# Patient Record
Sex: FEMALE | Race: WHITE | NOT HISPANIC OR LATINO | Employment: OTHER | URBAN - METROPOLITAN AREA
[De-identification: names, ages, dates, MRNs, and addresses within clinical notes are randomized per-mention and may not be internally consistent; named-entity substitution may affect disease eponyms.]

---

## 2021-04-08 ENCOUNTER — HOSPITAL ENCOUNTER (OUTPATIENT)
Dept: TELEMEDICINE | Facility: HOSPITAL | Age: 66
Discharge: HOME | End: 2021-04-08

## 2021-04-08 PROCEDURE — 99499 UNLISTED E&M SERVICE: CPT | Mod: GT,,, | Performed by: PSYCHIATRY & NEUROLOGY

## 2021-04-08 PROCEDURE — 99499 NO LOS: ICD-10-PCS | Mod: GT,,, | Performed by: PSYCHIATRY & NEUROLOGY

## 2023-09-26 ENCOUNTER — LAB VISIT (OUTPATIENT)
Dept: LAB | Facility: HOSPITAL | Age: 68
End: 2023-09-26
Payer: MEDICARE

## 2023-09-26 ENCOUNTER — TELEPHONE (OUTPATIENT)
Dept: NEUROLOGY | Facility: CLINIC | Age: 68
End: 2023-09-26

## 2023-09-26 ENCOUNTER — OFFICE VISIT (OUTPATIENT)
Dept: NEUROLOGY | Facility: CLINIC | Age: 68
End: 2023-09-26
Payer: MEDICARE

## 2023-09-26 VITALS
BODY MASS INDEX: 28.33 KG/M2 | DIASTOLIC BLOOD PRESSURE: 83 MMHG | SYSTOLIC BLOOD PRESSURE: 136 MMHG | WEIGHT: 176.25 LBS | RESPIRATION RATE: 16 BRPM | HEIGHT: 66 IN | HEART RATE: 83 BPM

## 2023-09-26 DIAGNOSIS — E78.2 MIXED HYPERLIPIDEMIA: ICD-10-CM

## 2023-09-26 DIAGNOSIS — F41.9 ANXIETY: ICD-10-CM

## 2023-09-26 DIAGNOSIS — R41.3 MEMORY LOSS: Primary | ICD-10-CM

## 2023-09-26 DIAGNOSIS — E53.8 B12 DEFICIENCY: ICD-10-CM

## 2023-09-26 DIAGNOSIS — E55.9 VITAMIN D DEFICIENCY, UNSPECIFIED: ICD-10-CM

## 2023-09-26 DIAGNOSIS — Z51.81 THERAPEUTIC DRUG MONITORING: ICD-10-CM

## 2023-09-26 DIAGNOSIS — M43.02 CERVICAL SPONDYLOLYSIS: ICD-10-CM

## 2023-09-26 DIAGNOSIS — R41.3 MEMORY LOSS: ICD-10-CM

## 2023-09-26 DIAGNOSIS — I10 PRIMARY HYPERTENSION: ICD-10-CM

## 2023-09-26 DIAGNOSIS — G47.33 OSA (OBSTRUCTIVE SLEEP APNEA): ICD-10-CM

## 2023-09-26 DIAGNOSIS — R41.82 ALTERED MENTAL STATUS, UNSPECIFIED ALTERED MENTAL STATUS TYPE: ICD-10-CM

## 2023-09-26 DIAGNOSIS — K21.9 GASTROESOPHAGEAL REFLUX DISEASE WITHOUT ESOPHAGITIS: ICD-10-CM

## 2023-09-26 DIAGNOSIS — D50.9 IRON DEFICIENCY ANEMIA, UNSPECIFIED IRON DEFICIENCY ANEMIA TYPE: ICD-10-CM

## 2023-09-26 LAB
ALBUMIN SERPL BCP-MCNC: 3.8 G/DL (ref 3.5–5.2)
ALP SERPL-CCNC: 91 U/L (ref 55–135)
ALT SERPL W/O P-5'-P-CCNC: 12 U/L (ref 10–44)
ANION GAP SERPL CALC-SCNC: 9 MMOL/L (ref 8–16)
AST SERPL-CCNC: 16 U/L (ref 10–40)
BASOPHILS # BLD AUTO: 0.03 K/UL (ref 0–0.2)
BASOPHILS NFR BLD: 0.4 % (ref 0–1.9)
BILIRUB SERPL-MCNC: 0.5 MG/DL (ref 0.1–1)
BUN SERPL-MCNC: 14 MG/DL (ref 8–23)
CALCIUM SERPL-MCNC: 9.4 MG/DL (ref 8.7–10.5)
CHLORIDE SERPL-SCNC: 106 MMOL/L (ref 95–110)
CO2 SERPL-SCNC: 28 MMOL/L (ref 23–29)
CREAT SERPL-MCNC: 0.9 MG/DL (ref 0.5–1.4)
CRP SERPL-MCNC: 7.2 MG/L (ref 0–8.2)
DIFFERENTIAL METHOD: ABNORMAL
EOSINOPHIL # BLD AUTO: 0.3 K/UL (ref 0–0.5)
EOSINOPHIL NFR BLD: 4.5 % (ref 0–8)
ERYTHROCYTE [DISTWIDTH] IN BLOOD BY AUTOMATED COUNT: 12.2 % (ref 11.5–14.5)
ERYTHROCYTE [SEDIMENTATION RATE] IN BLOOD BY PHOTOMETRIC METHOD: 41 MM/HR (ref 0–36)
EST. GFR  (NO RACE VARIABLE): >60 ML/MIN/1.73 M^2
GLUCOSE SERPL-MCNC: 104 MG/DL (ref 70–110)
HCT VFR BLD AUTO: 44.9 % (ref 37–48.5)
HGB BLD-MCNC: 15.7 G/DL (ref 12–16)
IMM GRANULOCYTES # BLD AUTO: 0.03 K/UL (ref 0–0.04)
IMM GRANULOCYTES NFR BLD AUTO: 0.4 % (ref 0–0.5)
LYMPHOCYTES # BLD AUTO: 1.6 K/UL (ref 1–4.8)
LYMPHOCYTES NFR BLD: 22.1 % (ref 18–48)
MCH RBC QN AUTO: 34.3 PG (ref 27–31)
MCHC RBC AUTO-ENTMCNC: 35 G/DL (ref 32–36)
MCV RBC AUTO: 98 FL (ref 82–98)
MONOCYTES # BLD AUTO: 0.4 K/UL (ref 0.3–1)
MONOCYTES NFR BLD: 5.2 % (ref 4–15)
NEUTROPHILS # BLD AUTO: 4.8 K/UL (ref 1.8–7.7)
NEUTROPHILS NFR BLD: 67.4 % (ref 38–73)
NRBC BLD-RTO: 0 /100 WBC
PLATELET # BLD AUTO: 245 K/UL (ref 150–450)
PMV BLD AUTO: 10.7 FL (ref 9.2–12.9)
POTASSIUM SERPL-SCNC: 3.5 MMOL/L (ref 3.5–5.1)
PROT SERPL-MCNC: 7.2 G/DL (ref 6–8.4)
RBC # BLD AUTO: 4.58 M/UL (ref 4–5.4)
SODIUM SERPL-SCNC: 143 MMOL/L (ref 136–145)
WBC # BLD AUTO: 7.18 K/UL (ref 3.9–12.7)

## 2023-09-26 PROCEDURE — 80053 COMPREHEN METABOLIC PANEL: CPT | Performed by: NURSE PRACTITIONER

## 2023-09-26 PROCEDURE — 87389 HIV-1 AG W/HIV-1&-2 AB AG IA: CPT | Performed by: NURSE PRACTITIONER

## 2023-09-26 PROCEDURE — 99999 PR PBB SHADOW E&M-EST. PATIENT-LVL IV: CPT | Mod: PBBFAC,,, | Performed by: NURSE PRACTITIONER

## 2023-09-26 PROCEDURE — 36415 COLL VENOUS BLD VENIPUNCTURE: CPT | Mod: PO | Performed by: NURSE PRACTITIONER

## 2023-09-26 PROCEDURE — 82306 VITAMIN D 25 HYDROXY: CPT | Performed by: NURSE PRACTITIONER

## 2023-09-26 PROCEDURE — 82746 ASSAY OF FOLIC ACID SERUM: CPT | Performed by: NURSE PRACTITIONER

## 2023-09-26 PROCEDURE — 99214 OFFICE O/P EST MOD 30 MIN: CPT | Mod: PBBFAC,PO | Performed by: NURSE PRACTITIONER

## 2023-09-26 PROCEDURE — 84425 ASSAY OF VITAMIN B-1: CPT | Performed by: NURSE PRACTITIONER

## 2023-09-26 PROCEDURE — 83921 ORGANIC ACID SINGLE QUANT: CPT | Performed by: NURSE PRACTITIONER

## 2023-09-26 PROCEDURE — 86140 C-REACTIVE PROTEIN: CPT | Performed by: NURSE PRACTITIONER

## 2023-09-26 PROCEDURE — 84443 ASSAY THYROID STIM HORMONE: CPT | Performed by: NURSE PRACTITIONER

## 2023-09-26 PROCEDURE — 85025 COMPLETE CBC W/AUTO DIFF WBC: CPT | Performed by: NURSE PRACTITIONER

## 2023-09-26 PROCEDURE — 99215 OFFICE O/P EST HI 40 MIN: CPT | Mod: S$PBB,,, | Performed by: NURSE PRACTITIONER

## 2023-09-26 PROCEDURE — 86592 SYPHILIS TEST NON-TREP QUAL: CPT | Performed by: NURSE PRACTITIONER

## 2023-09-26 PROCEDURE — 99215 PR OFFICE/OUTPT VISIT, EST, LEVL V, 40-54 MIN: ICD-10-PCS | Mod: S$PBB,,, | Performed by: NURSE PRACTITIONER

## 2023-09-26 PROCEDURE — 85651 RBC SED RATE NONAUTOMATED: CPT | Mod: PO | Performed by: NURSE PRACTITIONER

## 2023-09-26 PROCEDURE — 82140 ASSAY OF AMMONIA: CPT | Performed by: NURSE PRACTITIONER

## 2023-09-26 PROCEDURE — 99999 PR PBB SHADOW E&M-EST. PATIENT-LVL IV: ICD-10-PCS | Mod: PBBFAC,,, | Performed by: NURSE PRACTITIONER

## 2023-09-26 PROCEDURE — 0346U AD DETECT, BETA-AMYLOID 42/40 RATIO: CPT | Performed by: NURSE PRACTITIONER

## 2023-09-26 PROCEDURE — 82607 VITAMIN B-12: CPT | Performed by: NURSE PRACTITIONER

## 2023-09-26 RX ORDER — HYDROCHLOROTHIAZIDE 12.5 MG/1
12.5 TABLET ORAL DAILY
COMMUNITY

## 2023-09-26 RX ORDER — MEDROXYPROGESTERONE ACETATE 2.5 MG/1
2.5 TABLET ORAL DAILY
COMMUNITY

## 2023-09-26 RX ORDER — ROSUVASTATIN CALCIUM 10 MG/1
10 TABLET, COATED ORAL DAILY
COMMUNITY

## 2023-09-26 RX ORDER — FERROUS SULFATE 325(65) MG
325 TABLET ORAL
COMMUNITY

## 2023-09-26 RX ORDER — AMLODIPINE BESYLATE 10 MG/1
10 TABLET ORAL DAILY
COMMUNITY

## 2023-09-26 RX ORDER — MELOXICAM 15 MG/1
15 TABLET ORAL DAILY
COMMUNITY

## 2023-09-26 RX ORDER — FAMOTIDINE 20 MG/1
20 TABLET, FILM COATED ORAL 2 TIMES DAILY
COMMUNITY

## 2023-09-26 RX ORDER — FLUOXETINE HYDROCHLORIDE 20 MG/1
20 CAPSULE ORAL DAILY
COMMUNITY

## 2023-09-26 NOTE — PATIENT INSTRUCTIONS
Mild cognitive impairment (MCI) is defined as the transitional state between the cognitive changes of normal aging and the fully developed features of dementia. Some people with MCI experience little to no disease progression while others may progressively decline. Unfortunately, the clinical progression is unpredictable. Based on today's visit, I thinks that you lie somewhere between having MCI or even mild dementia. The cause is to be determines.     We will obtain some basic labs to rule out any reversible causes for your memory loss, such as a nutritional deficiency or a thyroid problem. We will also obtain some baseline neuroimaging to look at the brain structure itself. I have also referred you for a more in-depth cognitive assessment with our neuropyschologist. Please be aware that this process may take a few months to complete (about 3-4 months). We also need to get an EEG.     Certain medications have been shown to slow the clinical progression of MCI in some people. It is important to understand that these medications cannot reverse any pre-existing process. Prior to starting a medication, we need to get an EKG to ensure that you don't have any heart rhythm issues.     In the setting of MCI, it is very important to work diligently to keep the blood vessels healthy to prevent any worsening. You should continue to work closely with your PCP to control your blood pressure, cholesterol and blood sugar. The Mediteranean diet is also recommended to promote overall vascular health.     I recommend that you do wear your CPAP as untreated sleep apnea leads to decreased oxygen to the brain.     Continue not to drive    Follow up here in 4 weeks     While memory loss may not be reversible in many cases, we do know that there are several steps that you can take to prevent further memory loss:  Diet:  A Mediterranean style diet has been shown to be beneficial. This diet typically includes fresh fruits/vegetables, whole  grains, fish and poultry. Foods, such as red meat, dairy and processed foods are avoided.   Research indicates certain nutrients may aid in brain function, such as B vitamins (especially B6, B12, and folic acid), antioxidants (such as vitamins C and E, and beta carotene), and Omega-3 fatty acids.  Minimize or eliminate the use of alcohol     Medications:  Discuss any prescription or over the counter medications you might be taking with your doctor to avoid those that can cause sedation or worsen cognitive function, such as sleep aids, benzodiazepines, and antihistamines.     Socialization and Exercise:  30-45 minutes of brisk physical activity 5 days/week has been shown to improve function in vascular dementias, lower the risk of stroke and slow the progression of memory loss  Activities that are engaging or mentally stimulating, such as word puzzles, jigsaw puzzles and Sudoku, are also beneficial to cognitive health  Regular interaction with friends, family and community are also known to be helpful.     Sleep:  Establish a regular, consistent sleep pattern and practice good sleep hygiene.    Avoid screen time (computer, TV, smartphones or tablets) or heavy meals for at least an hour before bedtime.   Avoid caffeine or stimulants after 2 PM.   Exercise earlier in the day or mornings and keep your sleeping environment comfortable. Bedtime and wake-up times should be consistent every night and morning so the body becomes used to a single routine, even on the weekends.   Having a wind down routine (e.g., soft lights in the house, bath before bed, reduced fluid intake, songs, reading, less noise) also helps to promote sleep readiness.   Untreated obstructive sleep apnea can lead to an increased risk for stroke and further memory loss      STRATEGIES TO COPE WITH YOUR COGNITIVE DEFICITS:  Pay Attention!  Reduce distractions in the area  Look at the person speaking to you & paraphrase what they are saying  Write down  important things  Ask them to repeat themselves if you zone out  Ask people to simplify or reduce information if you need to  Processing Speed  Using multiple methods to learn new information, such as listening, taking notes, and recording, can be helpful  Give yourself enough time to complete certain tasks to reduce frustration  Executive Functioning  Don't try to multi-task. Do tasks separately to ensure that each gets completed.   Use a calendar or planner to keep you on track  Write down steps to complicated tasks in case you forget  Storing Information  Immediately after you learn something, try to recall it. Repeat this and gradually lengthen the interval between your recalls  Recalling information   Jog your memory! If you loose something, try to think back to when you last had it. Mentally walk yourself through the steps of your day to prod your memory.   Use clues, timers, memos, notes, etc.  Stay organized - keep your keys in a certain place, put your important papers in a certain place, etc.   Having a routine helps to anchor memories as well

## 2023-09-26 NOTE — PROGRESS NOTES
NEUROLOGY  Outpatient Consultation Visit     [facility]  [address] [facility], [place] [address]  [phone] (office) / [phone] (fax)    Patient Name:  Jackie Berry  :  1955  MR #:  [id number]  Acct #:  [id number]    Date of  Visit: 2023    Other Physicians:  Jessi Torre FNP (Primary Care Physician)      CHIEF COMPLAINT: Memory Loss        HISTORY OF PRESENT ILLNESS:  Jackie Berry is a 68 y.o. R-handed female seen in consultation for memory loss per Shasha Connors MD    Medical history is significant for cervical spondylosis, hx of thyroid disease, anxiety, HTN, vitamin B12 deficiency, GERD, iron deficiency anemia, HLD    Here today with her , who assists with history    HS level education. 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 . 2 adult kids, one in [place] and one in [place].     No FH of dementia that she knows.     Onset was about 5 years ago. It started as ST forgetfulness and repeating questions. She was confusing dates of events. She has been cleaning the same houses for decades and had trouble navigating to them. Every September, they vacation with friends to [place]. A few days ago, they were discussing their recent trip and she couldn't remember having visited this place before. She also later asked her  if he knew her (their) daughter and then thought that they recently got . This episode triggered them to seek a neurology evaluation. Progression has been gradual since onset per .     She is not driving anymore. Used to be a wonderful cook, but her  just retired and loves to cook so she doesn't have to do it anymore. Hasn't made any mistakes when she does cook. She used to pay the bills, but her  has been doing this as a precaution. He has also been helping with meds as a precaution.  notes some executive  "dysfunction. They don't notice any language difficulty.     No physical changes, like falls or gait change.     No personality changes or hallucinations. She does get depressed at times when she realizes that she is forgetting things, but not a day to day issue. Feels stable on Prozac.     She sleeps well. She was diagnosed with sleep apnea in the past and has a CPAP. Study was done 4-5 years ago in [place]; they don't know how severe. She doesn't use it consistently.     She initially saw Dr. Toussaint in [place] for evaluation. She did have NP testing at one time, maybe 5-6 years ago. They don't recall what the results indicated. She had a brain MRI that showed a "slight stroke" that she was asymptomatic of. About 1 year ago, she had a follow up visit. Repeat NP testing was recommended, but they have not been able to get in touch with anyone about this in over 1 year.     She is on monthly B12 injections. Denies hx of gastric surgery.     On progesterone, not sure why.     Former smoker, quit 40 years ago. Has a glass of wine or 2 in the evenings most days.     She had a colonoscopy that showed some ulcers and she was told to not take NSAIDs in the past.        Allergies:  Review of patient's allergies indicates:  No Known Allergies    Current Medications:  Current Outpatient Medications   Medication Sig Dispense Refill    amLODIPine (NORVASC) 10 MG tablet Take 10 mg by mouth once daily.      cyanocobalamin, vitamin B-12, 1,000 mcg/mL Kit Inject as directed.      famotidine (PEPCID) 20 MG tablet Take 20 mg by mouth 2 (two) times daily.      ferrous sulfate (FEOSOL) 325 mg (65 mg iron) Tab tablet Take 325 mg by mouth daily with breakfast.      FLUoxetine 20 MG capsule Take 20 mg by mouth once daily.      hydroCHLOROthiazide (HYDRODIURIL) 12.5 MG Tab Take 12.5 mg by mouth once daily.      medroxyPROGESTERone (PROVERA) 2.5 MG tablet Take 2.5 mg by mouth once daily.      meloxicam (MOBIC) 15 MG tablet Take 15 mg by mouth " "once daily.      rosuvastatin (CRESTOR) 10 MG tablet Take 10 mg by mouth once daily.       No current facility-administered medications for this visit.       Past Medical History:  History reviewed. No pertinent past medical history.    Past Surgical History:  History reviewed. No pertinent surgical history.    Family History:  family history is not on file.    Social History:   reports that she has quit smoking. Her smoking use included cigarettes. She has never used smokeless tobacco. She reports current alcohol use.      REVIEW OF SYSTEMS:  As per HPI    PHYSICAL EXAM:  /83 (BP Location: Right arm, Patient Position: Sitting, BP Method: Medium (Automatic))   Pulse 83   Resp 16   Ht 5' 6" (1.676 m)   Wt 79.9 kg (176 lb 4.1 oz)   BMI 28.45 kg/m²     General: Well groomed. No acute distress.  Pulmonary: Normal effort and rate.   Musculoskeletal: No obvious joint deformities, moves all extremities well.  Extremities: No clubbing, cyanosis. L knee edema.     Neurological exam:  Mental status: Awake and alert.  Oriented to person and situation. Aware in medical office, but not location. Disoriented to time. Recent memory impaired. Remote seems intact. Fund of knowledge seems limited (names president, but not sure of recent or upcoming holiday). Decreased attention and concentration. MMSE 16/30.   Speech/Language: No dysarthria or overt aphasia on conversation but does have mild WFD at times. Able to follow complex commands.   Cranial nerves (II-XII): Visual fields full. Pupils equal round and reactive to light, extraocular movements intact, no ptosis, no nystagmus. Facial sensation and symmetry intact bilaterally. Hearing grossly intact. Palate mobile and midline. Shoulder shrug normal bilaterally. Normal tongue protrusion.   Motor: 5 out of 5 strength throughout the upper and lower extremities bilaterally. Normal bulk and tone. No abnormal movements noted. No drift appreciated.   Sensation: Intact to light " "touch and vibration bilaterally.   DTR: 3+ at the knees and biceps bilaterally. + R Martines's. No clonus at the ankles.   Coordination: Finger-nose-finger testing intact bilaterally. AMARA normal bilaterally. No tremor. Romberg absent.   Gait: Antalgic, favoring the L knee (reports trip and fall going up stairs recently, injury to L knee and seeing ortho later today)    DIAGNOSTIC DATA:  I have personally reviewed provider notes, labs and imaging made available to me today.     Imaging:  Na    Cardiac:  Na     Labs:  Lab Results   Component Value Date    WBC 7.18 09/26/2023    HGB 15.7 09/26/2023    HCT 44.9 09/26/2023     09/26/2023    MCV 98 09/26/2023    RDW 12.2 09/26/2023     Lab Results   Component Value Date     09/26/2023    K 3.5 09/26/2023     09/26/2023    CO2 28 09/26/2023    BUN 14 09/26/2023    CREATININE 0.9 09/26/2023     09/26/2023    CALCIUM 9.4 09/26/2023     Lab Results   Component Value Date    PROT 7.2 09/26/2023    ALBUMIN 3.8 09/26/2023    BILITOT 0.5 09/26/2023    AST 16 09/26/2023    ALKPHOS 91 09/26/2023    ALT 12 09/26/2023     No results found for: "INR", "PROTIME", "PTT"  No results found for: "CHOL", "HDL", "LDLCALC", "TRIG", "CHOLHDL"  No results found for: "LABA1C", "HGBA1C"   Lab Results   Component Value Date    [id number] 813 09/26/2023     Lab Results   Component Value Date    FOLATE 8.5 09/26/2023     Lab Results   Component Value Date    TSH 1.712 09/26/2023           ASSESSMENT & PLAN:  Jackie Berry is a 68 y.o. R-handed female seen in consultation for memory loss.     Problem List Items Addressed This Visit          Neuro    Memory loss - Primary    Overview     MMSE:  16/30 Sept 2023         Current Assessment & Plan     Discussed normal aging / MCI / dementia with pt and family  History and exam today are unfortunately suggestive of neurodegenerative disease, such as early onset AD.   Reviewed role of Rx in this setting --would like to start " AChEi. Need to obtain baseline EKG to ensure no conduction abnormalities. Based on MMSE score today, unlikely a candidate for lecanemab. [place] [facility] [place]. Discussed vascular RF and importance of continued efforts to maximize vascular health  Medication list reviewed and discussed   Discussed necessity of ACP     Plan:  MRI brain w wo to assess parenchyma / vascular burden  Serologies  EKG  30 min EEG  NP testing  Request records from outside neurology              Psychiatric    Anxiety       Cardiac/Vascular    HTN (hypertension)    Mixed hyperlipidemia       Oncology    Fe deficiency anemia       Endocrine    B12 deficiency       GI    GERD (gastroesophageal reflux disease)       Orthopedic    Cervical spondylolysis       Other    ESHA (obstructive sleep apnea)     Other Visit Diagnoses       Altered mental status, unspecified altered mental status type        Therapeutic drug monitoring        Vitamin D deficiency, unspecified                Follow up: 4-6 weeks    I spent a total of 60 minutes on the day of the visit.    This includes face to face time with the patient, as well as non-face to face time preparing for and completing the visit (review of prior diagnostic testing and clinical notes, obtaining or reviewing history, documenting clinical information in the EMR, independently interpreting and communicating results to the patient/family and coordinating ongoing care).       I appreciate the opportunity to participate in the care of this patient. Please feel free to contact me with any concerns or questions.       Shruti Clarke, ACNPC-AG  [facility]  [address] [facility] [place] [address]

## 2023-09-27 PROBLEM — M43.02 CERVICAL SPONDYLOLYSIS: Status: ACTIVE | Noted: 2023-09-27

## 2023-09-27 PROBLEM — D50.9 FE DEFICIENCY ANEMIA: Status: ACTIVE | Noted: 2023-09-27

## 2023-09-27 PROBLEM — G47.33 OSA (OBSTRUCTIVE SLEEP APNEA): Status: ACTIVE | Noted: 2023-09-27

## 2023-09-27 PROBLEM — K21.9 GERD (GASTROESOPHAGEAL REFLUX DISEASE): Status: ACTIVE | Noted: 2023-09-27

## 2023-09-27 PROBLEM — E53.8 B12 DEFICIENCY: Status: ACTIVE | Noted: 2023-09-27

## 2023-09-27 PROBLEM — R41.3 MEMORY LOSS: Status: ACTIVE | Noted: 2023-09-27

## 2023-09-27 PROBLEM — F41.9 ANXIETY: Status: ACTIVE | Noted: 2023-09-27

## 2023-09-27 PROBLEM — E78.2 MIXED HYPERLIPIDEMIA: Status: ACTIVE | Noted: 2023-09-27

## 2023-09-27 PROBLEM — I10 HTN (HYPERTENSION): Status: ACTIVE | Noted: 2023-09-27

## 2023-09-27 LAB
25(OH)D3+25(OH)D2 SERPL-MCNC: 24 NG/ML (ref 30–96)
AMMONIA PLAS-SCNC: 25 UMOL/L (ref 10–50)
FOLATE SERPL-MCNC: 8.5 NG/ML (ref 4–24)
HIV 1+2 AB+HIV1 P24 AG SERPL QL IA: NORMAL
RPR SER QL: NORMAL
TSH SERPL DL<=0.005 MIU/L-ACNC: 1.71 UIU/ML (ref 0.4–4)
VIT B12 SERPL-MCNC: 813 PG/ML (ref 210–950)

## 2023-09-27 NOTE — ASSESSMENT & PLAN NOTE
Discussed normal aging / MCI / dementia with pt and family  History and exam today are unfortunately suggestive of neurodegenerative disease, such as early onset AD.   Reviewed role of Rx in this setting --would like to start AChEi. Need to obtain baseline EKG to ensure no conduction abnormalities. Based on MMSE score today, unlikely a candidate for lecanemab. Discussed referral to cognitive sub specialist at [facility] [place]. They will consider this. Discussed vascular RF and importance of continued efforts to maximize vascular health  Medication list reviewed and discussed   Discussed necessity of ACP     Plan:  MRI brain w wo to assess parenchyma / vascular burden  Serologies  EKG  30 min EEG  NP testing  Request records from outside neurology

## 2023-09-30 LAB — METHYLMALONATE SERPL-SCNC: 0.2 UMOL/L

## 2023-10-02 LAB — VIT B1 BLD-MCNC: 87 UG/L (ref 38–122)

## 2023-10-03 LAB
ABETA 42/40 RATIO: 0.14
AL BETA40 PLAS-MCNC: 327 PG/ML
AL BETA42 PLAS-MCNC: 46 PG/ML
AMPA-R AB CBA, SERUM: NEGATIVE
AMPHIPHYSIN AB TITR SER: NEGATIVE {TITER}
ANNOTATION COMMENT IMP: NORMAL
CASPR2-IGG CBA: NEGATIVE
CV2 IGG TITR SER: NEGATIVE {TITER}
DPPX IGG SERPL QL IF: NEGATIVE
GABA-B-R AB CBA, SERUM: NEGATIVE
GAD65 AB SER-SCNC: 0 NMOL/L
GFAP IFA, SERUM: NEGATIVE
GLIAL NUC TYPE 1 AB TITR SER: NEGATIVE {TITER}
HU1 AB TITR SER: NEGATIVE {TITER}
HU2 AB TITR SER IF: NEGATIVE {TITER}
HU3 AB TITR SER: NEGATIVE {TITER}
IGLON5 IFA, S: NEGATIVE
IMMUNOLOGIST REVIEW: NORMAL
LGI1-IGG CBA: NEGATIVE
M SEPTIN-7 IFA, S: NEGATIVE
MGLUR1 AB IFA, SERUM: NEGATIVE
NEUROCHONDRIN, IFA, S: NEGATIVE
NIF IFA, S: NEGATIVE
NMDA-R AB CBA, SERUM: NEGATIVE
PCA-1 AB TITR SER: NEGATIVE {TITER}
PCA-2 AB TITR SER: NEGATIVE {TITER}
PCA-TR AB TITR SER: NEGATIVE {TITER}

## 2023-10-04 ENCOUNTER — TELEPHONE (OUTPATIENT)
Dept: NEUROLOGY | Facility: CLINIC | Age: 68
End: 2023-10-04
Payer: MEDICARE

## 2023-10-04 NOTE — TELEPHONE ENCOUNTER
----- Message from Shruti Clarke NP sent at 10/4/2023  8:19 AM CDT -----  Please let them know that vitamin D level is slightly low. Should start an OTC vit D3 supplement. Dose is 1000 IU per day.

## 2023-10-10 ENCOUNTER — HOSPITAL ENCOUNTER (OUTPATIENT)
Dept: RADIOLOGY | Facility: HOSPITAL | Age: 68
Discharge: HOME | End: 2023-10-10
Attending: NURSE PRACTITIONER
Payer: MEDICARE

## 2023-10-10 DIAGNOSIS — R41.3 MEMORY LOSS: ICD-10-CM

## 2023-10-10 PROCEDURE — 70553 MRI BRAIN STEM W/O & W/DYE: CPT | Mod: TC,PO

## 2023-10-10 PROCEDURE — A9585 GADOBUTROL INJECTION: HCPCS | Mod: PO | Performed by: NURSE PRACTITIONER

## 2023-10-10 PROCEDURE — 25500020 PHARM REV CODE 255: Mod: PO | Performed by: NURSE PRACTITIONER

## 2023-10-10 RX ORDER — GADOBUTROL 604.72 MG/ML
7.5 INJECTION INTRAVENOUS
Status: COMPLETED | OUTPATIENT
Start: 2023-10-10 | End: 2023-10-10

## 2023-10-10 RX ADMIN — GADOBUTROL 7.5 ML: 604.72 INJECTION INTRAVENOUS at 01:10

## 2023-10-11 ENCOUNTER — HOSPITAL ENCOUNTER (OUTPATIENT)
Dept: CARDIOLOGY | Facility: HOSPITAL | Age: 68
Discharge: HOME | End: 2023-10-11
Attending: NURSE PRACTITIONER
Payer: MEDICARE

## 2023-10-11 ENCOUNTER — PROCEDURE VISIT (OUTPATIENT)
Dept: NEUROLOGY | Facility: HOSPITAL | Age: 68
End: 2023-10-11
Attending: NURSE PRACTITIONER
Payer: MEDICARE

## 2023-10-11 ENCOUNTER — TELEPHONE (OUTPATIENT)
Dept: NEUROLOGY | Facility: CLINIC | Age: 68
End: 2023-10-11
Payer: MEDICARE

## 2023-10-11 DIAGNOSIS — Z51.81 THERAPEUTIC DRUG MONITORING: ICD-10-CM

## 2023-10-11 DIAGNOSIS — R41.82 ALTERED MENTAL STATUS, UNSPECIFIED ALTERED MENTAL STATUS TYPE: ICD-10-CM

## 2023-10-11 PROCEDURE — 93010 ELECTROCARDIOGRAM REPORT: CPT | Mod: ,,, | Performed by: INTERNAL MEDICINE

## 2023-10-11 PROCEDURE — 95816 EEG AWAKE AND DROWSY: CPT | Mod: 26,,, | Performed by: PSYCHIATRY & NEUROLOGY

## 2023-10-11 PROCEDURE — 93005 ELECTROCARDIOGRAM TRACING: CPT

## 2023-10-11 PROCEDURE — 93010 EKG 12-LEAD: ICD-10-PCS | Mod: ,,, | Performed by: INTERNAL MEDICINE

## 2023-10-11 PROCEDURE — 95816 PR EEG,W/AWAKE & DROWSY RECORD: ICD-10-PCS | Mod: 26,,, | Performed by: PSYCHIATRY & NEUROLOGY

## 2023-10-11 NOTE — PROCEDURES
EEG,w/awake & asleep record    Date/Time: 10/11/2023 10:30 AM    Performed by: Blaine Baker MD  Authorized by: Shruti Clarke NP      ELECTROENCEPHALOGRAM REPORT     DATE OF SERVICE: 10/11/23  EEG NUMBER: ON   REQUESTED BY: Nicho  [place] [facility] [place]     METHODOLOGY              Electroencephalographic (EEG) recording is with electrodes placed according to the International 10-20 placement system.  Thirty two (32) channels of digital signal (sampling rate of 512/sec) including T1 and T2 was simultaneously recorded from the scalp and may include  EKG, EMG, and/or eye monitors.  Recording band pass was 0.1 to 512 hz.  Digital video recording of the patient is simultaneously recorded with the EEG.  The patient is instructed report clinical symptoms which may occur during the recording session.  EEG and video recording is stored and archived in digital format. Activation procedures which include photic stimulation, hyperventilation and instructing patients to perform simple task are done in selected patients.               The EEG is displayed on a monitor screen and can be reviewed using different montages.  Computer assisted analysis is employed to detect spike and electrographic seizure activity.   The entire record is submitted for computer analysis.  The entire recording is visually reviewed and the times identified by computer analysis as being spikes or seizures are reviewed again.  Compresses spectral analysis (CSA) is also performed on the activity recorded from each individual channel.  This is displayed as a power display of frequencies from 0 to 30 Hz over time.   The CSA is reviewed looking for asymmetries in power between homologous areas of the scalp and then compared with the original EEG recording.                [employer] software was also utilized in the review of this study.  This software suite analyzes the EEG recording in multiple domains.  Coherence and rhythmicity is computed  to identify EEG sections which may contain organized seizures.  Each channel undergoes analysis to detect presence of spike and sharp waves which have special and morphological characteristic of epileptic activity.  The routine EEG recording is converted from spacial into frequency domain.  This is then displayed comparing homologous areas to identify areas of significant asymmetry.  Algorithm to identify non-cortically generated artifact is used to separate eye movement, EMG and other artifact from the EEG     EEG FINDINGS  The record shows a good  organization at rest, consisting of a 10 Hz posterior dominant rhythm with good  reactivity. There is mild bilateral beta activity.     Drowsiness is characterized by attenuation of the background, vertex waves, and bilateral theta slowing. Stage II sleep is characterized by slowing, vertex waves, and symmetric sleep spindles.      Provocative maneuvers including hyperventilation and photic stimulation were performed.      EKG recording shows a sinus rhythm .     There is no push button or clinical event.     IMPRESSION:  Study is within normal limits.      Blaine Baker MD

## 2023-10-11 NOTE — TELEPHONE ENCOUNTER
----- Message from Shruti Clarke NP sent at 10/11/2023  4:47 PM CDT -----  The EEG was normal (no evidence of seizures)

## 2023-10-11 NOTE — TELEPHONE ENCOUNTER
Spoke with patients  and informed per Shruti Clarke EEG was normal. Patients  voiced understanding.

## 2023-10-11 NOTE — PROGRESS NOTES
ELECTROENCEPHALOGRAM REPORT    DATE OF SERVICE: 10/11/23  EEG NUMBER: ON   REQUESTED BY: Nicho  [place]ICE[place] [facility] [place]    METHODOLOGY   Electroencephalographic (EEG) recording is with electrodes placed according to the International 10-20 placement system.  Thirty two (32) channels of digital signal (sampling rate of 512/sec) including T1 and T2 was simultaneously recorded from the scalp and may include  EKG, EMG, and/or eye monitors.  Recording band pass was 0.1 to 512 hz.  Digital video recording of the patient is simultaneously recorded with the EEG.  The patient is instructed report clinical symptoms which may occur during the recording session.  EEG and video recording is stored and archived in digital format. Activation procedures which include photic stimulation, hyperventilation and instructing patients to perform simple task are done in selected patients.    The EEG is displayed on a monitor screen and can be reviewed using different montages.  Computer assisted analysis is employed to detect spike and electrographic seizure activity.   The entire record is submitted for computer analysis.  The entire recording is visually reviewed and the times identified by computer analysis as being spikes or seizures are reviewed again.  Compresses spectral analysis (CSA) is also performed on the activity recorded from each individual channel.  This is displayed as a power display of frequencies from 0 to 30 Hz over time.   The CSA is reviewed looking for asymmetries in power between homologous areas of the scalp and then compared with the original EEG recording.     [employer] software was also utilized in the review of this study.  This software suite analyzes the EEG recording in multiple domains.  Coherence and rhythmicity is computed to identify EEG sections which may contain organized seizures.  Each channel undergoes analysis to detect presence of spike and sharp waves which have special and morphological  characteristic of epileptic activity.  The routine EEG recording is converted from spacial into frequency domain.  This is then displayed comparing homologous areas to identify areas of significant asymmetry.  Algorithm to identify non-cortically generated artifact is used to separate eye movement, EMG and other artifact from the EEG    EEG FINDINGS  The record shows a good  organization at rest, consisting of a 10 Hz posterior dominant rhythm with good  reactivity. There is mild bilateral beta activity.    Drowsiness is characterized by attenuation of the background, vertex waves, and bilateral theta slowing. Stage II sleep is characterized by slowing, vertex waves, and symmetric sleep spindles.     Provocative maneuvers including hyperventilation and photic stimulation were performed.     EKG recording shows a sinus rhythm .    There is no push button or clinical event.    IMPRESSION:  Study is within normal limits.     Blaine Baker MD

## 2023-10-31 ENCOUNTER — OFFICE VISIT (OUTPATIENT)
Dept: NEUROLOGY | Facility: CLINIC | Age: 68
End: 2023-10-31
Payer: MEDICARE

## 2023-10-31 VITALS
RESPIRATION RATE: 16 BRPM | DIASTOLIC BLOOD PRESSURE: 71 MMHG | SYSTOLIC BLOOD PRESSURE: 125 MMHG | HEIGHT: 66 IN | WEIGHT: 174.94 LBS | BODY MASS INDEX: 28.11 KG/M2 | HEART RATE: 76 BPM

## 2023-10-31 DIAGNOSIS — G30.9 MAJOR NEUROCOGNITIVE DISORDER DUE TO ALZHEIMER'S DISEASE, WITHOUT BEHAVIORAL DISTURBANCE: ICD-10-CM

## 2023-10-31 DIAGNOSIS — F02.80 MAJOR NEUROCOGNITIVE DISORDER DUE TO ALZHEIMER'S DISEASE, WITHOUT BEHAVIORAL DISTURBANCE: ICD-10-CM

## 2023-10-31 PROCEDURE — 99999 PR PBB SHADOW E&M-EST. PATIENT-LVL III: CPT | Mod: PBBFAC,,, | Performed by: NURSE PRACTITIONER

## 2023-10-31 PROCEDURE — 99215 PR OFFICE/OUTPT VISIT, EST, LEVL V, 40-54 MIN: ICD-10-PCS | Mod: S$PBB,,, | Performed by: NURSE PRACTITIONER

## 2023-10-31 PROCEDURE — 99999 PR PBB SHADOW E&M-EST. PATIENT-LVL III: ICD-10-PCS | Mod: PBBFAC,,, | Performed by: NURSE PRACTITIONER

## 2023-10-31 PROCEDURE — 99213 OFFICE O/P EST LOW 20 MIN: CPT | Mod: PBBFAC,PO | Performed by: NURSE PRACTITIONER

## 2023-10-31 PROCEDURE — 99215 OFFICE O/P EST HI 40 MIN: CPT | Mod: S$PBB,,, | Performed by: NURSE PRACTITIONER

## 2023-10-31 RX ORDER — DONEPEZIL HYDROCHLORIDE 5 MG/1
5 TABLET, FILM COATED ORAL NIGHTLY
Qty: 30 TABLET | Refills: 0 | Status: SHIPPED | OUTPATIENT
Start: 2023-10-31 | End: 2023-11-27

## 2023-10-31 NOTE — ASSESSMENT & PLAN NOTE
Diagnostic testing to date reviewed with pt and :  - MRI brain w wo acutely negative. Shows evidence of atrophy, most notable in the temporal & parietal regions. Also shows mild to moderate microangiopathy in the evelyn and PV white matter.   - routine EEG negative  - serologies notable for low ABeta 42/40 ratio and vitamin D insufficiency   - EKG demonstrates NSR   - did not receive requested records from prior neurologist yet     Most likely etiology is AD based on data to date. Also suspect some contributions from vascular RF, as well as ETOH intake over the years. Reviewed that LP is the diagnostic standard for AD, however. Again, she is not likely a candidate for lecanemab based on MMSE score of 16/30 last visit, so no clear indication for LP at this time.   Reviewed role of AChEi medications -- will start donepezil 5 mg HS now   Given situation, they are agreeable to obtaining an e consultation with Dr. Montilla -- not sure if she may be a candidate for any other available trials / therapies or if she would benefit from traveling to main campus to establish care there   Continue efforts to maximize vascular health. Discussed the MIND diet.  Reinforced need to moderate ETOH intake -- try to cut down to 1 glass of wine in the evenings

## 2023-10-31 NOTE — PROGRESS NOTES
NEUROLOGY  Outpatient Visit     [facility]  [address] [facility], [place] [address]  [phone] (office) / [phone] (fax)    Patient Name:  Jackie Berry  :  1955  MR #:  [id number]  Acct #:  [id number]    Date of  Visit: 10/31/2023    Other Physicians:  Jessi Torre FNP (Primary Care Physician)      CHIEF COMPLAINT: Memory Loss        Interval history:  10/31/23:  Jackie Berry is a 68 y.o. R-handed female seen in f/u for memory loss     Medical history is significant for cervical spondylosis, hx of thyroid disease, anxiety, HTN, vitamin B12 deficiency, GERD, iron deficiency anemia, HLD    Here today with her , who assists with history    MRI brain showed atrophy, more in the temporal-parietal regions. It also showed a mild to moderate degree of microangiopathy for her age. Serologies showed vit D insufficiency, so she is supplementing. AD Detect indicated high risk ratio. EEG was negative. EKG showed NSR.     No new symptoms or change from last visit.  got a cookbook with Med diet style recipes. Their daughter got him a caregiver book to read.     Mrs. Mejia is planning to have C spine surgery for her chronic neck pain and headaches.     Was apparently drinking more heavily over the last 5-6 years (3-4 bourbon and cokes in the evening) Now, drinks wine in the evenings. Will drink 3-4 glasses if not moderated by .     HISTORY OF PRESENT ILLNESS 23:  Jackie Berry is a 68 y.o. R-handed female seen in consultation for memory loss per No ref. provider found    Medical history is significant for cervical spondylosis, hx of thyroid disease, anxiety, HTN, vitamin B12 deficiency, GERD, iron deficiency anemia, HLD    Here today with her , who assists with history    HS level education. 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 . 2 adult  "kids, one in [place] and one in [place].     No FH of dementia that she knows.     Onset was about 5 years ago. It started as ST forgetfulness and repeating questions. She was confusing dates of events. She has been cleaning the same houses for decades and had trouble navigating to them. Every September, they vacation with friends to [place]. A few days ago, they were discussing their recent trip and she couldn't remember having visited this place before. She also later asked her  if he knew her (their) daughter and then thought that they recently got . This episode triggered them to seek a neurology evaluation. Progression has been gradual since onset per .     She is not driving anymore. Used to be a wonderful cook, but her  just retired and loves to cook so she doesn't have to do it anymore. Hasn't made any mistakes when she does cook. She used to pay the bills, but her  has been doing this as a precaution. He has also been helping with meds as a precaution.  notes some executive dysfunction. They don't notice any language difficulty.     No physical changes, like falls or gait change.     No personality changes or hallucinations. She does get depressed at times when she realizes that she is forgetting things, but not a day to day issue. Feels stable on Prozac.     She sleeps well. She was diagnosed with sleep apnea in the past and has a CPAP. Study was done 4-5 years ago in [place]; they don't know how severe. She doesn't use it consistently.     She initially saw Dr. Toussaint in [place] for evaluation. She did have NP testing at one time, maybe 5-6 years ago. They don't recall what the results indicated. She had a brain MRI that showed a "slight stroke" that she was asymptomatic of. About 1 year ago, she had a follow up visit. Repeat NP testing was recommended, but they have not been able to get in touch with anyone about this in over 1 year.     She is on monthly B12 injections. Denies hx " "of gastric surgery.     On progesterone, not sure why.     Former smoker, quit 40 years ago. Has a glass of wine or 2 in the evenings most days.     She had a colonoscopy that showed some ulcers and she was told to not take NSAIDs in the past.      Allergies:  Review of patient's allergies indicates:  No Known Allergies    Current Medications:  Current Outpatient Medications   Medication Sig Dispense Refill    amLODIPine (NORVASC) 10 MG tablet Take 10 mg by mouth once daily.      cyanocobalamin, vitamin B-12, 1,000 mcg/mL Kit Inject as directed.      famotidine (PEPCID) 20 MG tablet Take 20 mg by mouth 2 (two) times daily.      ferrous sulfate (FEOSOL) 325 mg (65 mg iron) Tab tablet Take 325 mg by mouth daily with breakfast.      FLUoxetine 20 MG capsule Take 20 mg by mouth once daily.      hydroCHLOROthiazide (HYDRODIURIL) 12.5 MG Tab Take 12.5 mg by mouth once daily.      medroxyPROGESTERone (PROVERA) 2.5 MG tablet Take 2.5 mg by mouth once daily.      meloxicam (MOBIC) 15 MG tablet Take 15 mg by mouth once daily.      rosuvastatin (CRESTOR) 10 MG tablet Take 10 mg by mouth once daily.      donepeziL (ARICEPT) 5 MG tablet Take 1 tablet (5 mg total) by mouth every evening. 30 tablet 0     No current facility-administered medications for this visit.       Past Medical History:  History reviewed. No pertinent past medical history.    Past Surgical History:  History reviewed. No pertinent surgical history.    Family History:  family history is not on file.    Social History:   reports that she has quit smoking. Her smoking use included cigarettes. She has never used smokeless tobacco. She reports current alcohol use.      REVIEW OF SYSTEMS:  As per HPI    PHYSICAL EXAM:  /71 (BP Location: Right arm, Patient Position: Sitting, BP Method: Medium (Automatic))   Pulse 76   Resp 16   Ht 5' 6" (1.676 m)   Wt 79.3 kg (174 lb 15 oz)   BMI 28.24 kg/m²     General: Well groomed. No acute distress.   Pulmonary: Normal " effort and rate.   Musculoskeletal: No obvious joint deformities, moves all extremities well.  Extremities: No clubbing, cyanosis or edema.      Neurological exam:  Mental status: Awake and alert.  Oriented to person and situation. Aware in medical office, but not location. Disoriented to time. Recent memory impaired. Fund of knowledge seems limited. Decreased attention and concentration.    Speech/Language: No dysarthria or overt aphasia on conversation but does have mild WFD at times. Able to follow complex commands.   Cranial nerves (II-XII): Visual fields full. Extraocular movements intact, no ptosis, no nystagmus. Facial sensation and symmetry intact bilaterally. Hearing grossly intact. Palate and tongue deferred. Shoulder shrug normal bilaterally.   Motor: 5 out of 5 strength throughout the upper and lower extremities bilaterally. Normal bulk and tone. No abnormal movements noted. No drift appreciated.   Sensation: Intact to light touch.   DTR: 3+ at the knees and biceps bilaterally. + R Martines's. No clonus at the ankles.   Coordination: Finger-nose-finger testing intact bilaterally. AMARA normal bilaterally. No tremor.  Gait: Fluid with normal stride and station.     DIAGNOSTIC DATA:  I have personally reviewed provider notes, labs and imaging made available to me today.     Imaging:  MRI brain w wo 10/10/23:      FINDINGS:     Gray-white matter distinction is preserved throughout the brain parenchyma. The ventricles are midline without mass effect. There are involutional changes of the brain parenchyma with expected dilatation of the ventricles. Periventricular and deep white matter FLAIR and T2 hyperintensities are noted felt to reflect changes of chronic vessel ischemic disease. No areas of restricted diffusion or hemorrhage identified. No intra or extra-axial fluid collections or mass. No abnormal intracranial enhancement. Vascular flow voids are within normal limits. Calvarial bone marrow signal is  "normal.     IMPRESSION:     1.  No acute intracranial abnormality.  2.   Chronic and involutional changes of the brain.    EEG 10/11/23:  IMPRESSION:  Study is within normal limits.     Cardiac:  EKG 10/11/23:  Normal sinus rhythm     Labs:  Lab Results   Component Value Date    WBC 7.18 09/26/2023    HGB 15.7 09/26/2023    HCT 44.9 09/26/2023     09/26/2023    MCV 98 09/26/2023    RDW 12.2 09/26/2023     Lab Results   Component Value Date     09/26/2023    K 3.5 09/26/2023     09/26/2023    CO2 28 09/26/2023    BUN 14 09/26/2023    CREATININE 0.9 09/26/2023     09/26/2023    CALCIUM 9.4 09/26/2023     Lab Results   Component Value Date    PROT 7.2 09/26/2023    ALBUMIN 3.8 09/26/2023    BILITOT 0.5 09/26/2023    AST 16 09/26/2023    ALKPHOS 91 09/26/2023    ALT 12 09/26/2023     No results found for: "INR", "PROTIME", "PTT"  No results found for: "CHOL", "HDL", "LDLCALC", "TRIG", "CHOLHDL"  No results found for: "LABA1C", "HGBA1C"   Lab Results   Component Value Date    UEMNEASR40 813 09/26/2023     Lab Results   Component Value Date    FOLATE 8.5 09/26/2023     Lab Results   Component Value Date    TSH 1.712 09/26/2023     Component      Latest Ref Rng 9/26/2023   Encephalopathy, Interpretation SEE BELOW    NMDA-R Ab CBA, Serum      Negative  Negative    Glutamic Acid Decarb Ab      <=0.02 nmol/L 0.00    BLAISE-B-R Ab CBA, Serum      Negative  Negative    AMPA-R Ab CBA, Serum      Negative  Negative    PAVAL LEE-1, Serum      Negative  Negative    PAVAL LEE-2, Serum      Negative  Negative    PAVAL LEE-3, Serum      Negative  Negative    PAVAL AGNA-1, Serum      Negative  Negative    PAVAL, PCA-1, Serum      Negative  Negative    PAVAL, PCA-2, Serum      Negative  Negative    PAVAL, PCA-Tr, Serum      Negative  Negative    PAVAL, Amphiphysin Ab, Serum      Negative  Negative    CRMP-5 IgG      Negative  Negative    LGI1-IgG CBA      Negative  Negative    CASPR2-IgG CBA      Negative  " Negative    DPPX Ab IFA, S      Negative  Negative    mGluR1 Ab, IFA, Serum      Negative  Negative    GFAP IFA, Serum      Negative  Negative    IgLON5 IFA, S      Negative  Negative    NIF IFA, S      Negative  Negative    NEUROCHONDRIN, IFA, S      Negative  Negative    M SEPTIN-7 IFA, S      Negative  Negative    IFA NOTES None.    Abeta42      pg/mL 46    Abeta40      pg/mL 327    Abeta 42/40 Ratio 0.141 (L)    Ammonia      10 - 50 umol/L 25    Methlymalonic Acid      <0.40 umol/L 0.20    RPR      Non-reactive  Non-reactive    Thiamine      38 - 122 ug/L 87    Vit D, 25-Hydroxy      30 - 96 ng/mL 24 (L)    HIV 1/2 Ag/Ab      Non-reactive  Non-reactive    CRP      0.0 - 8.2 mg/L 7.2    Sed Rate      0 - 36 mm/Hr 41 (H)       Legend:  (L) Low  (H) High      ASSESSMENT & PLAN:  Jackie Berry is a 68 y.o. R-handed female seen in f/u for memory loss.     Problem List Items Addressed This Visit          Neuro    Major neurocognitive disorder due to Alzheimer's disease, without behavioral disturbance    Overview     MMSE:  16/30 Sept 2023         Current Assessment & Plan     Diagnostic testing to date reviewed with pt and :  - MRI brain w wo acutely negative. Shows evidence of atrophy, most notable in the temporal & parietal regions. Also shows mild to moderate microangiopathy in the evelyn and PV white matter.   - routine EEG negative  - serologies notable for low ABeta 42/40 ratio and vitamin D insufficiency   - EKG demonstrates NSR   - did not receive requested records from prior neurologist yet     Most likely etiology is AD based on data to date. Also suspect some contributions from vascular RF, as well as ETOH intake over the years. Reviewed that LP is the diagnostic standard for AD, however. Again, she is not likely a candidate for lecanemab based on MMSE score of 16/30 last visit, so no clear indication for LP at this time.   Reviewed role of AChEi medications -- will start donepezil 5 mg HS now    Given situation, they are agreeable to obtaining an e consultation with Dr. Montilla -- not sure if she may be a candidate for any other available trials / therapies or if she would benefit from traveling to main campus to establish care there   Continue efforts to maximize vascular health. Discussed the MIND diet.  Reinforced need to moderate ETOH intake -- try to cut down to 1 glass of wine in the evenings                 Follow up: after NP testing    I spent a total of 40 minutes on the day of the visit.    This includes face to face time with the patient, as well as non-face to face time preparing for and completing the visit (review of prior diagnostic testing and clinical notes, obtaining or reviewing history, documenting clinical information in the EMR, independently interpreting and communicating results to the patient/family and coordinating ongoing care).       I appreciate the opportunity to participate in the care of this patient. Please feel free to contact me with any concerns or questions.       Shruti Clarke, ACNPC-AG  [facility]  [address] [place] [address]

## 2023-10-31 NOTE — PATIENT INSTRUCTIONS
I will do an econsult with Dr. Montilla, the cognitive neurology specialist, to see if he thinks that it would be beneficial for you to travel to [facility] to see him.     For now, we will start the medication called donepezil.     Take 5 mg at bedtime for 30 days, then if tolerable we will increase to the goal of 10 mg daily. It is important to understand that this medication cannot reverse any pre-existing process. Monitor for side effects, like diarrhea, nausea, worsening confusion or sleep disruption and let me know if you have issues.     it is very important to work diligently to keep the blood vessels healthy to prevent any worsening. You should continue to work closely with your PCP to control your blood pressure, cholesterol and blood sugar. The Mediteranean diet or the MIND diet is also recommended to promote overall vascular health.     Follow up with me here after the cognitive testing has been done.   Please call or message anytime for questions or concerns.   I will let you know what Dr. Montilla says after reviewing your chart.     While memory loss may not be reversible in many cases, we do know that there are several steps that you can take to prevent further memory loss:  Diet:  A Mediterranean style diet has been shown to be beneficial. This diet typically includes fresh fruits/vegetables, whole grains, fish and poultry. Foods, such as red meat, dairy and processed foods are avoided.   Research indicates certain nutrients may aid in brain function, such as B vitamins (especially B6, B12, and folic acid), antioxidants (such as vitamins C and E, and beta carotene), and Omega-3 fatty acids.  Minimize or eliminate the use of alcohol     Medications:  Discuss any prescription or over the counter medications you might be taking with your doctor to avoid those that can cause sedation or worsen cognitive function, such as sleep aids, benzodiazepines, and antihistamines.     Socialization and Exercise:  30-45  minutes of brisk physical activity 5 days/week has been shown to improve function in vascular dementias, lower the risk of stroke and slow the progression of memory loss  Activities that are engaging or mentally stimulating, such as word puzzles, jigsaw puzzles and Sudoku, are also beneficial to cognitive health  Regular interaction with friends, family and community are also known to be helpful.     Sleep:  Establish a regular, consistent sleep pattern and practice good sleep hygiene.    Avoid screen time (computer, TV, smartphones or tablets) or heavy meals for at least an hour before bedtime.   Avoid caffeine or stimulants after 2 PM.   Exercise earlier in the day or mornings and keep your sleeping environment comfortable. Bedtime and wake-up times should be consistent every night and morning so the body becomes used to a single routine, even on the weekends.   Having a wind down routine (e.g., soft lights in the house, bath before bed, reduced fluid intake, songs, reading, less noise) also helps to promote sleep readiness.   Untreated obstructive sleep apnea can lead to an increased risk for stroke and further memory loss      STRATEGIES TO COPE WITH YOUR COGNITIVE DEFICITS:  Pay Attention!  Reduce distractions in the area  Look at the person speaking to you & paraphrase what they are saying  Write down important things  Ask them to repeat themselves if you zone out  Ask people to simplify or reduce information if you need to  Processing Speed  Using multiple methods to learn new information, such as listening, taking notes, and recording, can be helpful  Give yourself enough time to complete certain tasks to reduce frustration  Executive Functioning  Don't try to multi-task. Do tasks separately to ensure that each gets completed.   Use a calendar or planner to keep you on track  Write down steps to complicated tasks in case you forget  Storing Information  Immediately after you learn something, try to recall  it. Repeat this and gradually lengthen the interval between your recalls  Recalling information   Jog your memory! If you loose something, try to think back to when you last had it. Mentally walk yourself through the steps of your day to prod your memory.   Use clues, timers, memos, notes, etc.  Stay organized - keep your keys in a certain place, put your important papers in a certain place, etc.   Having a routine helps to anchor memories as well    RESOURCES:    Website for the Alzheimer's Association:  http://www.alz.org/   Excellent resources for finding community resources  Action plan navigator and online tools  Call 1359.602.7998 for 24/7 helpline    http://www.communityresourcefinder.org    [facility] Office of Aging and Adult Services:  Http://www.ldh.[place].gov/index.cfm/subhome/12/n/7    Peace With Dementia: http://careCode42.StatusPage/    Website for [facility] Agency on Ageing: http://goea.[place].[facility]/index.cfm?md=hilaria&tmp=category&catID=38&nid=24&ssid=0&startIndex=1    PATIENT/FAMILY RESOURCES:  1. Alzheimer's Association                                                                 http://www.alz.org  2. Alzheimer's Foundation of [place]                                               http://www.alzfdn.org  3. The Alzheimer's Disease Education and Referral Center             https://www.alfred.nih.gov/alzheimers  4. Lewy Body Dementia Association                                                  http://www.lbda.org  5. National [place] of Mental Health                                                 http://www.nimh.nih.gov  6. National [place] on Mental Illness                                                http://www.[place].org  7. Mental Health [place]                                                                   http://www.mentalhealthamerica.net  8. Mental Health.gov                                                                           http://www.mentalhealth.gov  9.  National [place] for Behavioral Health                                          http://www.thenationalcouncil.org  10. Substance Abuse and Mental Health Services Administration   http://www.samhsa.gov  11. Licensed local counselors, social workers, psychiatrists, psychologists - one starting point is the Psychology Today website, therapist finder

## 2023-11-05 ENCOUNTER — E-CONSULT (OUTPATIENT)
Dept: NEUROLOGY | Facility: HOSPITAL | Age: 68
End: 2023-11-05
Payer: MEDICARE

## 2023-11-05 DIAGNOSIS — G31.9 NEURODEGENERATIVE COGNITIVE IMPAIRMENT: Primary | ICD-10-CM

## 2023-11-05 PROCEDURE — 99449 PR INTERPROF, PHONE/INTERNET/EHR, CONSULT, >= 31 MINS: ICD-10-PCS | Mod: ,,, | Performed by: PSYCHIATRY & NEUROLOGY

## 2023-11-05 PROCEDURE — 99449 NTRPROF PH1/NTRNET/EHR 31/>: CPT | Mod: ,,, | Performed by: PSYCHIATRY & NEUROLOGY

## 2023-11-05 NOTE — CONSULTS
[facility]  Response for E-Consult     Patient Name: Jackie Berry  MRN: [id number]  Primary Care Provider: Jessi Torre FNP   Requesting Provider: Shruti Clarke, MATTI  Consults    Clinical History    A 68-year-old right-handed female consulted for memory loss, with a significant medical history including various chronic conditions. Over the past five years, she has experienced progressive cognitive decline, notably with short-term memory issues and confusion about significant personal details. Her  has taken over driving, bill paying, and medication management as precautions. Neuropsychological testing and a brain MRI have been conducted in the past, revealing a minor stroke, but follow-up assessments have been incomplete. Her recent MMSE score of 16/30 suggests potential early-onset Alzheimer's disease. Treatment with an acetylcholinesterase inhibitor is considered, pending an EKG, and a referral to a cognitive subspecialist is under consideration. The patient's medication regimen has been reviewed, and the need for advanced care planning was discussed.    MRI brain/head without contrast on 10/10/2023  Formal interpretation by Radiology:  Chronic and involutional changes of the brain.  Independently reviewed radiological imaging by Fer Olivier MD. MPH. Behavioral Neurologist  T1: moderate degree of left greater than right anterior cingulate occipital atrophy regional sulci widening of the anterior to mid cingulate sulcus suggestive of regional atrophy of the ventral and dorsal medial prefrontal cortex alongside significant regional sulci widening of the left greater than right occipital cortices suggestive of degeneration the inferior Longitudinal fasciculus. relative sparing of the parietal and retro splenial cortices. Intact corpus callosum with normal volume. Intact midbrain with normal volume and ratio. Relatively mild bilateral hippocampal volume loss. mild left greater than  right hydrocephalus ex vacuo  T2/FLAIR: mild left greater than right anterior greater than posterior periventricular capping suggestive of transependymal edema. posterior left greater than right periventricular capping more suggestive of wallerian degeneration changes of the left occipital cortices.  DWI/ADC: No Significant DWI hyperintensities/hypointensities. No ADC correlation.  SWI/GRE: No Significant hypointensities to suggest cortical/subcortical hemosiderin deposition.  Impression: : Moderate degree of left greater than right medial prefrontal cortices and occipital cortices regional atrophy most well appreciated in the anterior cingulate occipital lobe suggestive of degeneration associated with the inferior longitudinal fasciculus. Mild secondary periventricular capping without significant burden of subcortical white matter disease. No clear evidence of strategic strokes. No clear evidence of frontotemporal degeneration. Most likely underlying pathologies limbic predominant Lewy body disease process.     Assessment:  A 68-year-old female presents with early-onset dementia, exhibiting a moderate degree of atrophy in the left anterior cingulate and occipital regions, more pronounced than on the right, raising concerns for a degenerative process mediated by Lewy body disease. Although no significant movement or mood disorders were observed during the examination, the brain scans suggest a high probability of at least a mixed pathology. Given the patient's recent MMSE score of 14/30, she would not be an ideal candidate for anti-amyloid therapy or ongoing clinical trials currently at [facility]. Consequently, she also would not be suited for a subspecialty referral to the [facility] neurocognitive clinic with Dr. Montilla. However, if the family is concerned about a genetic cause for the early-onset neurodegenerative process and wishes to pursue additional diagnostic testing, we would be happy to facilitate that  evaluation.    Recommendation:   - Given stage of disease patient would not be candidate for anti-amyloid therapy or ongoing clinical trials at [facility] as such not a good candidate for referral to subspeciality clinic  - If family are concerned of a genetic etiology for patient's neurodegenerative process would be happy to evaluate at [facility] with Dr. Montilla  - Recommend considering [id number] to screen for alzheimer disease and Syn-One skin biopsy for alpha-synuclein pathology if family requests additional testing.     Total time of Consultation: 35 minute    I did not speak to the requesting provider verbally about this.     *This eConsult is based on the clinical data available to me and is furnished without benefit of a physical examination. The eConsult will need to be interpreted in light of any clinical issues or changes in patient status not available to me at the time of filing this eConsults. Significant changes in patient condition or level of acuity should result in immediate formal consultation and reevaluation. Please alert me if you have further questions.    Thank you for this eConsult referral.     Fer Montilla MD  [facility]

## 2023-11-07 ENCOUNTER — TELEPHONE (OUTPATIENT)
Dept: NEUROLOGY | Facility: CLINIC | Age: 68
End: 2023-11-07
Payer: MEDICARE

## 2023-11-07 NOTE — TELEPHONE ENCOUNTER
"Spoke with patients  and informed Shruti did an econsult with Dr. Montilla and per Dr. Montilla "there doesn't seem to be any indication to refer pt to dementia subspecialist at this point given that she is not a candidate for the IV medication as we previously discussed." Patients  voiced understanding.  "

## 2023-11-27 RX ORDER — DONEPEZIL HYDROCHLORIDE 5 MG/1
TABLET, FILM COATED ORAL
Qty: 30 TABLET | Refills: 0 | Status: SHIPPED | OUTPATIENT
Start: 2023-11-27 | End: 2023-12-15 | Stop reason: SDUPTHER

## 2023-12-15 DIAGNOSIS — F02.80 MAJOR NEUROCOGNITIVE DISORDER DUE TO ALZHEIMER'S DISEASE, WITHOUT BEHAVIORAL DISTURBANCE: ICD-10-CM

## 2023-12-15 DIAGNOSIS — G30.9 MAJOR NEUROCOGNITIVE DISORDER DUE TO ALZHEIMER'S DISEASE, WITHOUT BEHAVIORAL DISTURBANCE: ICD-10-CM

## 2023-12-15 DIAGNOSIS — R41.82 ALTERED MENTAL STATUS, UNSPECIFIED ALTERED MENTAL STATUS TYPE: ICD-10-CM

## 2023-12-15 DIAGNOSIS — G31.9 NEURODEGENERATIVE COGNITIVE IMPAIRMENT: Primary | ICD-10-CM

## 2023-12-15 RX ORDER — DONEPEZIL HYDROCHLORIDE 5 MG/1
TABLET, FILM COATED ORAL
Qty: 30 TABLET | Refills: 0 | Status: SHIPPED | OUTPATIENT
Start: 2023-12-15 | End: 2024-01-25

## 2023-12-15 NOTE — TELEPHONE ENCOUNTER
----- Message from Jeffrey Lozano sent at 12/15/2023  2:18 PM CST -----  Regarding: Refill request  Contact:   Type:  RX Refill Request    Who Called: Pts  Vish  Refill or New Rx:refill    RX Name and Strength:donepeziL (ARICEPT) 5 MG tablet  How is the patient currently taking it? (ex. 1XDay):1x day  Is this a 30 day or 90 day RX:30    Preferred Pharmacy with phone number:  [facility] - [place], [place] - [address]  [address]  [place] [address]  Phone: [phone] Fax: [phone]        Local or Mail Order:local  Ordering Provider:Vince    Would the patient rather a call back or a response via [facility]? Call back    Best Call Back Number:[phone]    Additional Information: Sts they are going out of town on the 19th and are trying to get her refills before then,  They will not be back till Jan 4th.   Also sts that it was discussed about possibly bumping her to 10 mg .... Wants to know about that as well.    Please advise -- Thank you

## 2024-01-25 DIAGNOSIS — F02.80 MAJOR NEUROCOGNITIVE DISORDER DUE TO ALZHEIMER'S DISEASE, WITHOUT BEHAVIORAL DISTURBANCE: ICD-10-CM

## 2024-01-25 DIAGNOSIS — R41.82 ALTERED MENTAL STATUS, UNSPECIFIED ALTERED MENTAL STATUS TYPE: ICD-10-CM

## 2024-01-25 DIAGNOSIS — G30.9 MAJOR NEUROCOGNITIVE DISORDER DUE TO ALZHEIMER'S DISEASE, WITHOUT BEHAVIORAL DISTURBANCE: ICD-10-CM

## 2024-01-25 DIAGNOSIS — G31.9 NEURODEGENERATIVE COGNITIVE IMPAIRMENT: ICD-10-CM

## 2024-01-25 RX ORDER — DONEPEZIL HYDROCHLORIDE 10 MG/1
10 TABLET, FILM COATED ORAL NIGHTLY
Qty: 90 TABLET | Refills: 1 | Status: SHIPPED | OUTPATIENT
Start: 2024-01-25 | End: 2024-07-23

## 2024-05-01 ENCOUNTER — OFFICE VISIT (OUTPATIENT)
Dept: NEUROLOGY | Facility: CLINIC | Age: 69
End: 2024-05-01
Payer: MEDICARE

## 2024-05-01 DIAGNOSIS — G30.9 MAJOR NEUROCOGNITIVE DISORDER DUE TO ALZHEIMER'S DISEASE, WITHOUT BEHAVIORAL DISTURBANCE: Primary | ICD-10-CM

## 2024-05-01 DIAGNOSIS — F02.80 MAJOR NEUROCOGNITIVE DISORDER DUE TO ALZHEIMER'S DISEASE, WITHOUT BEHAVIORAL DISTURBANCE: Primary | ICD-10-CM

## 2024-05-01 PROCEDURE — 99499 UNLISTED E&M SERVICE: CPT | Mod: 95,,, | Performed by: PSYCHIATRY & NEUROLOGY

## 2024-05-01 NOTE — PATIENT INSTRUCTIONS
"Cognitive Recommendations:  Repetition, structure and routine are known to help with cognitive difficulties and can maximize current functioning. Therefore, it is recommended that Ms. Berry establish set routines for activities including medication administration, eating, household chores, and errands. This repetition and routine will reduce cognitive demands and associated confusion.  Designate a specific place in the home (e.g., a "memory table" or a memory bowl) for easily lost items such as a wallet, glasses, or keys. This can help to reduce the frequency with which such items are misplaced and the frustration with being unable to locate lost items.  Make sure that important information is not communicated in a distracting environment. For example, if important information is being conveyed, make sure it is done in an environment free of noise or distracting activity such that she cannot fully attend to the information.   Dont attempt to multi-task.  Separate tasks so that each can be completed one at a time.  Break down large projects into smaller tasks and write down the steps to completing the task.    Allowing sufficient time to complete tasks will reduce frustration and help to ensure completion.  Resources:   Consider resources for support through the [place] Department of Human Services' Division Aging and Adult Services program (https://www.[facility].[place].gov/adults-seniors/), [place] Chapter of the Alzheimer's Association (https://www.alz.org/[place]), the Family Caregiver [place] (www.caregiver.org), and the the American Psychological Association (http://www.apa.org/pi/about/publications/caregivers/consumers/index.aspxconsumers/index.aspx).  Care partners can contact the Dementia Caregiver Support Program (https://[facility].my.[employer].com/dscportal/s/) to learn more about [employer] Dementia Education Series via Zoom; Dementia Caregiver Support Group via Zoom; or volunteer-led in-person Dementia Caregiver " Support Group. If you have any questions, please contact [email or web address] or call [phone].     Practice good cognitive/brain health hygiene:  Engage in regular exercise, which increases alertness and arousal and can improve attention and focus.    Get a good nights sleep, as this can enhance alertness and cognition.  Eat healthy foods and balanced meals. It is notable that research indicates certain nutrients may aid in brain function, such as B vitamins (especially B6, B12, and folic acid), antioxidants (such as vitamins C and E, and beta carotene), and Omega-3 fatty acids. Talk with your physician or nutritionist about whats right for you.   Keep your brain active. Find activities to stay mentally active, such as reading, games (cards, checkers), puzzles (crosswords, Sudoku, jig saw), crafts (models, woodworking), gardening, or participating in activities in the community.  Stay socially engaged. Continue staying active with your family and friends.  Limit alcohol use.    Prepare for the future: Ms. Berry and caregivers should consider formal arrangements to allow a designated person to make medical and financial decisions for Ms. Berry, should she become unable to do so.  Options to consider include designating a healthcare proxy, medical and/or financial power of , and completing advanced directives for healthcare decisions and estate planning (e.g., finalizing a will).  If cost is prohibitive, [facility] [employer] Services (https://ls.org/) provides free  for individuals with low income.     If you have any questions, please contact me at [phone].    Heydi Mann, Ph.D., ABPP  Board Certified in Clinical Neuropsychology  [facility] - Department of Neurology

## 2024-05-01 NOTE — PROGRESS NOTES
NEUROPSYCHOLOGY CONSULT (TELEHEALTH)    Referral Information  Name: Jackie Berry  MRN: [id number]  : 1955  Age: 68 y.o.  Race: White  Gender: female  Referring Provider: Shruti Clarke NP   Billing: NA  Telemedicine:   The patient location is: [place], [place]  The provider location is: [place], [place]  The chief complaint leading to consultation/medical necessity is: Cognitive concerns  Visit type: Virtual visit with synchronous audio only (telephone)  Total time spent with patient: 10 minutes  The reason for the audio only service rather than synchronous audio and video virtual visit was related to technical difficulties or patient preference/necessity.     Each patient to whom I provide medical services by telemedicine is:  (1) informed of the relationship between the physician and patient and the respective role of any other health care provider with respect to management of the patient; and (2) notified that they may decline to receive medical services by telemedicine and may withdraw from such care at any time. Patient verbally consented to receive this service via voice-only telephone call.    This service was not originating from a related E/M service provided within the previous 7 days nor will  to an E/M service or procedure within the next 24 hours or my soonest available appointment.  Prevailing standard of care was able to be met in this audio-only visit.    Consent/Emergency Plan: Her  was present on the call.    SUMMARY/TREATMENT PLAN   Results from the interview indicate the following diagnoses and treatment plan recommendations. The patient is not able to follow a treatment plan without help from family.    Diagnoses/Plan:  Problem List Items Addressed This Visit          Neuro    Major neurocognitive disorder due to Alzheimer's disease, without behavioral disturbance - Primary     Summary/Recommendations:  Ms. Berry has a diagnosis of early-onset Alzheimer's disease, with  symptoms starting in approximately 2019, and need for assistance in instrumental activities of daily living. She has reportedly completed neuropsychological assessment in the past and has expressed anxiety and reluctance to complete additional neuropsychological assessment. Performance on a brief mental status measure in September 2023 was reduced (Mini Mental Status Exam [MMSE]=16/30). Ms. Berry'  indicated he is comfortable with the current treatment plan and that Ms. Berry has been anxious/upset about the upcoming evaluation. We discussed weighing the risks/benefits of testing and opted not to move forward with the assessment at this time. Cognitive changes may be monitored with brief assessment in clinic, and I am available to Ms. Berry and her  if they have questions or want to talk through symptom management strategies.    Thank you for allowing me to participate in Ms. Berry' care.  If you have any questions, please contact me at [phone].     Heydi Mann, Ph.D., ABPP  Board Certified in Clinical Neuropsychology  [facility] - Department of Neurology

## 2024-05-01 NOTE — Clinical Note
I talked with them about their wants/needs for testing, and we opted not to move forward. She's very anxious/upset about the testing, and given her MMSE score, I'm not sure the outcome is worth the pressure on her at this time. Let me know if you want to discuss.

## 2024-07-11 ENCOUNTER — TELEPHONE (OUTPATIENT)
Dept: NEUROLOGY | Facility: CLINIC | Age: 69
End: 2024-07-11
Payer: MEDICARE

## 2024-07-23 DIAGNOSIS — G31.9 NEURODEGENERATIVE COGNITIVE IMPAIRMENT: ICD-10-CM

## 2024-07-23 DIAGNOSIS — G30.9 MAJOR NEUROCOGNITIVE DISORDER DUE TO ALZHEIMER'S DISEASE, WITHOUT BEHAVIORAL DISTURBANCE: ICD-10-CM

## 2024-07-23 DIAGNOSIS — F02.80 MAJOR NEUROCOGNITIVE DISORDER DUE TO ALZHEIMER'S DISEASE, WITHOUT BEHAVIORAL DISTURBANCE: ICD-10-CM

## 2024-07-23 DIAGNOSIS — R41.82 ALTERED MENTAL STATUS, UNSPECIFIED ALTERED MENTAL STATUS TYPE: ICD-10-CM

## 2024-07-23 RX ORDER — DONEPEZIL HYDROCHLORIDE 10 MG/1
10 TABLET, FILM COATED ORAL NIGHTLY
Qty: 90 TABLET | Refills: 1 | Status: SHIPPED | OUTPATIENT
Start: 2024-07-23 | End: 2025-01-19

## 2024-07-23 NOTE — TELEPHONE ENCOUNTER
----- Message from Rohan Carlisle sent at 7/23/2024 11:03 AM CDT -----  Contact: Spouse  Type:  RX Refill Request    Who Called:  Spouse/Vish  Refill or New Rx:  Refill  RX Name and Strength:    donepeziL (ARICEPT) 10 MG tablet    How is the patient currently taking it? (ex. 1XDay):  as directed  Is this a 30 day or 90 day RX:  90  Preferred Pharmacy with phone number:    [facility] - [place], [place] - [address]  [address] [address] [address]  Phone: [phone] Fax: [phone]      Local or Mail Order:  Local  Ordering Provider:  Vince Ortez Call Back Number:  [phone]   Additional Information:

## 2024-08-14 ENCOUNTER — OFFICE VISIT (OUTPATIENT)
Dept: NEUROLOGY | Facility: CLINIC | Age: 69
End: 2024-08-14
Payer: MEDICARE

## 2024-08-14 VITALS
WEIGHT: 165.56 LBS | HEART RATE: 60 BPM | BODY MASS INDEX: 26.72 KG/M2 | DIASTOLIC BLOOD PRESSURE: 68 MMHG | RESPIRATION RATE: 16 BRPM | SYSTOLIC BLOOD PRESSURE: 122 MMHG

## 2024-08-14 DIAGNOSIS — G30.9 MAJOR NEUROCOGNITIVE DISORDER DUE TO ALZHEIMER'S DISEASE, WITHOUT BEHAVIORAL DISTURBANCE: Primary | ICD-10-CM

## 2024-08-14 DIAGNOSIS — F02.80 MAJOR NEUROCOGNITIVE DISORDER DUE TO ALZHEIMER'S DISEASE, WITHOUT BEHAVIORAL DISTURBANCE: Primary | ICD-10-CM

## 2024-08-14 PROCEDURE — 99499 UNLISTED E&M SERVICE: CPT | Mod: S$PBB,,, | Performed by: NURSE PRACTITIONER

## 2024-08-14 PROCEDURE — 99999 PR PBB SHADOW E&M-EST. PATIENT-LVL III: CPT | Mod: PBBFAC,,, | Performed by: NURSE PRACTITIONER

## 2024-08-14 PROCEDURE — 99483 ASSMT & CARE PLN PT COG IMP: CPT | Mod: S$PBB,,, | Performed by: NURSE PRACTITIONER

## 2024-08-14 PROCEDURE — 99213 OFFICE O/P EST LOW 20 MIN: CPT | Mod: PBBFAC,PO | Performed by: NURSE PRACTITIONER

## 2024-08-14 RX ORDER — ESTRADIOL 0.1 MG/G
1 CREAM VAGINAL NIGHTLY
COMMUNITY

## 2024-08-14 RX ORDER — CHOLECALCIFEROL (VITAMIN D3) 25 MCG
2000 TABLET ORAL DAILY
COMMUNITY

## 2024-08-14 RX ORDER — GLUCOSAMINE/CHONDRO SU A 500-400 MG
1 TABLET ORAL DAILY
COMMUNITY

## 2024-08-14 RX ORDER — FLUCONAZOLE 100 MG/1
100 TABLET ORAL
COMMUNITY
Start: 2024-04-01 | End: 2024-08-14

## 2024-08-14 RX ORDER — ASPIRIN 81 MG/1
TABLET, FILM COATED ORAL
COMMUNITY
Start: 2024-05-24

## 2024-08-14 NOTE — PATIENT INSTRUCTIONS
Continue the same donepezil 10 mg nightly.     Let me know if the neuropathy symptoms become more troublesome.     While memory loss may not be reversible in many cases, we do know that there are several steps that you can take to prevent further memory loss:  Diet:  A Mediterranean style diet has been shown to be beneficial. This diet typically includes fresh fruits/vegetables, whole grains, fish and poultry. Foods, such as red meat, dairy and processed foods are avoided.   Research indicates certain nutrients may aid in brain function, such as B vitamins (especially B6, B12, and folic acid), antioxidants (such as vitamins C and E, and beta carotene), and Omega-3 fatty acids.  Minimize or eliminate the use of alcohol     Medications:  Discuss any prescription or over the counter medications you might be taking with your doctor to avoid those that can cause sedation or worsen cognitive function, such as sleep aids, benzodiazepines, and antihistamines.     Socialization and Exercise:  30-45 minutes of brisk physical activity 5 days/week has been shown to improve function in vascular dementias, lower the risk of stroke and slow the progression of memory loss  Activities that are engaging or mentally stimulating, such as word puzzles, jigsaw puzzles and Sudoku, are also beneficial to cognitive health  Regular interaction with friends, family and community are also known to be helpful.     Sleep:  Establish a regular, consistent sleep pattern and practice good sleep hygiene.    Avoid screen time (computer, TV, smartphones or tablets) or heavy meals for at least an hour before bedtime.   Avoid caffeine or stimulants after 2 PM.   Exercise earlier in the day or mornings and keep your sleeping environment comfortable. Bedtime and wake-up times should be consistent every night and morning so the body becomes used to a single routine, even on the weekends.   Having a wind down routine (e.g., soft lights in the house, bath  before bed, reduced fluid intake, songs, reading, less noise) also helps to promote sleep readiness.   Untreated obstructive sleep apnea can lead to an increased risk for stroke and further memory loss      STRATEGIES TO COPE WITH YOUR COGNITIVE DEFICITS:  Pay Attention!  Reduce distractions in the area  Look at the person speaking to you & paraphrase what they are saying  Write down important things  Ask them to repeat themselves if you zone out  Ask people to simplify or reduce information if you need to  Processing Speed  Using multiple methods to learn new information, such as listening, taking notes, and recording, can be helpful  Give yourself enough time to complete certain tasks to reduce frustration  Executive Functioning  Don't try to multi-task. Do tasks separately to ensure that each gets completed.   Use a calendar or planner to keep you on track  Write down steps to complicated tasks in case you forget  Storing Information  Immediately after you learn something, try to recall it. Repeat this and gradually lengthen the interval between your recalls  Recalling information   Jog your memory! If you loose something, try to think back to when you last had it. Mentally walk yourself through the steps of your day to prod your memory.   Use clues, timers, memos, notes, etc.  Stay organized - keep your keys in a certain place, put your important papers in a certain place, etc.   Having a routine helps to anchor memories as well        Website for the Alzheimer[employer]:  http://www.alz.org/   Excellent resources for finding community resources  Action plan navigator and online tools  Call 1175.722.6784 for 24/7 helpline    http://www.communityresourcefinder.org          FOR CAREGIVERS:  Caring for a person with dementia is difficult and challenging. Most caregivers are not prepared for the many obstacles they will face along this journey of caregiving. Memory loss and other changes to the brain caused by  dementia can create stress, anxiety, and irrational behaviors. The importance of routine and familiarity to persons with dementia is profound! Daily structure can help decrease these undesired behaviors such as aggression, restlessness and agitation. As a result, the caregiver will experience less stress and be able to give better care.  How does daily structure result in less stress?  Daily routines help reduce stress and anxiety because they help everyone involved to know what to expect. Persons with dementia thrive on familiarity. Familiarity is important because dementia gradually impairs a persons ability to plan, initiate, and complete an activity. By creating an environment of familiar routines and activities, it allows them to feel comforted and calm. If they can still perform an activity, they can still retain their sense of control and independence. Furthermore, establishing a familiar pattern of events can help transfer the schedule of a daily routine into the long-term memory portion of the brain.  What should I consider when creating a routine?  If your loved one has cognitive impairment or is in the early stages of dementia, pay close attention to his or her bathing, dressing, grooming, eating and toileting routines. At what time of day or where in the house do they occur? Does he or she have favorite clothing items or colors? What are his or her favorite foods or beverages? At this time in the disease, diligently observing them and helping maintain them can be beneficial in the long-run. If his or her dementia is in a later stage, try to recall them. The more a caregiver can facilitate activities that resonate with their loved ones pre-dementia life, the better.  Other familiar activities and interests are important to the caregiving experience. What types of music does your loved one enjoy, or even better, which specific songs? Do they have favorite television shows or movies that bring them flor?  "What hobbies or leisure activities does he or she like to do? If you are not sure, start making a note of them. The more you can engage your loved one in these activities, the more secure and comfortable your loved one will feel.  What else can I do?  If you are a caregiver for a person with early dementia, hopefully this information will ease your experience. It is important that your loved one does as much as they can for themselves, for as long as they can. As the disease progresses, it is still important to maintain these routines. Eventually, your loved one will need your help. If your loved one can still perform some tasks with your assistance, try doing them together. Doing everything on your own may be easier, but it isnt the best approach.  A routine that includes activities within your loved ones ability level is the key. In doing so, youll create a predictable environment that brings some comfort and understanding in their now confusing world. Offering your loved one a day without surprises is the best way to help them expend energy, relieve anxiety, and minimize undesirable behaviors.    FOR ADDITIONAL SUPPORT, VISIT https://alzheimersproject.org/contact/    The [employer] Association administers the nationwide "Safe Return" program with identification bracelets, necklaces, or clothing tags and 24-hour assistance. More information is available online at https://www.alz.org/help-support/caregiving/safety/medicalert-with-24-7-wandering-support     Consider reading the following books:     The 36-Hour Day     Alzheimer's Through the Stages.     Dementia with G.R.A.C.E.        BEHAVIOR MANAGEMENT STRATEGIES      Remember that you cannot reason with acute psychosis or confusion. Unless there is an immediate safety issue, there is no need to challenge or correct the person.  For example, if a pt is hallucinating, do not argue with the person that what they are seeing is not real. If they are expressing " "paranoia, empathize gently with their fear, and attempt to redirect them to a different activity.   If the person is particularly stuck on a topic or activity, as long as the activity is safe and is not worsening their agitation, you don't need to intervene.      Communicate calmly, simply, and concisely     Reassure the person that they are safe and you are here to help  Try not to express irritation or anger  Speak quietly and calmly, do not shout or threaten the person. Avoid provocation.   Establish verbal contact and provide orientation and reassurance.  Attempt to identify the patient's wants and feelings, listen to what they are saying, and reflect those wants and feelings back to the patient.  Dont use sarcasm as a weapon    Set clear limits on behavior in a calm voice ("You cannot hit me.")  Offer choices and optimism ("Which toothpaste would you like to use today?")     Redirect the person to an alternative behavior ("Can you come help me with this?)    Avoid saying things like, "We already went over this!" "You can't keep doing this." "I already explained this to you"  Instead, simply nod calmly and acknowledge what the person is saying ("Yes, that makes sense."), and then request their help or company in a different behavior.   Having a mental list of activities the patient enjoys can be helpful with redirection. For example, do they enjoy going for walks? Eating a piece of candy?   If redirection becomes challenging, you can place objects that your family member enjoys strategically in the home in order to use for distraction. This is particularly useful if there are items that they often talk about or frequently ask you questions about; or if they are visually striking. Once you focus the person on this object, talk about it briefly until they are calm and then attempt to redirect to a new behavior.   Sometimes activities which are repetitive can be calming, particularly if there is a comforting " "sensory element. For example, pt may enjoy folding soft towels or laundry.     Limit overstimulation     Decrease distractions by turning off the TV, computer, any fluorescent lights that hum, etc.  Ask any casual visitors to leave--the fewer people the better  If the person is very agitated, avoid touching them, and respect their personal space  Sit down and ask the person to sit down also     SAFETY STRATEGIES     Address all immediate safety issues  Does the person still have access to the car and keys? Take the keys away, or disconnect the car battery.  Are they wandering at night? Install locks on the outside doors. A keypad lock works well. Alarms on bedroom doors can also be helpful.   Are they turning on the stove and risking a fire? If you cannot limit their access to the kitchen, you may need to unplug dangerous devices any time you are not in the room.   If the person is exhibiting poor judgment throughout the day, they likely need someone supervising them at all times.   Do they still have access to significant financial assets? Limit their access to accounts, and consult with a  if necessary.   Identify situations that seem to trigger agitation or a problematic behavior, and modify the person's environment to minimize or eliminate that trigger.   For example, is their behavior worse if they don't get a good night's sleep? Or if they don't eat or drink enough? If so, prioritize those activities and build behavior plans to support them.  Do they get upset about not being allowed to answer the phone when it rings? Put all of the phones in the house on "silent."   Do they become paranoid that certain family members are trying to take advantage of them? Limit contact with the targeted family member as much as possible, and re-introduce them slowly after some time has passed.                     "

## 2024-08-14 NOTE — ASSESSMENT & PLAN NOTE
Stable since last visit  Continue same donepezil 10 mg daily. No indication for memantine at this time.     Cognition and function were assessed as documented above. The functional assessment staging test (FAST) score is 2. The patient is felt to have limited decision making capacity. Medications were reconciled and reviewed for high-risk medications. The patient's behavior and psychiatric health were reviewed and addressed. PHQ-2 score was 0. The patient and family were counseled on safety in the home and with regards to the operation of vehicles. Discussed caregiver needs and social support. Advance Care Plan was reviewed. Written care plan and support information provided to the patient and/or caregiver.

## 2024-08-14 NOTE — PROGRESS NOTES
NEUROLOGY  Outpatient Visit     [facility]  [address] [facility] [address], [place] [address]  [phone] (office) / [phone] (fax)    Patient Name:  Jackie Berry  :  1955  MR #:  [id number]  Acct #:  [id number]    Date of  Visit: 2024    Other Physicians:  Jessi Torre FNP (Primary Care Physician)      CHIEF COMPLAINT: Memory Loss        Interval history:  24:  Jackie Berry is a 68 y.o. R-handed female seen in f/u for major neurocognitive disorder due to AD.      Medical history is significant for cervical spondylosis, hx of thyroid disease, anxiety, HTN, vitamin B12 deficiency, GERD, iron deficiency anemia, HLD    Here today with her , who assists with history    Last visit was 10/2023. E consulted with Dr. Montilla, who agreed that she is not a candidate for amyloid clearing therapy. We started her on donepezil. They elected not to pursue formal NP testing.     Tolerating donepezil 10 mg without SE. They both feel that she is stable.     Physically well. Having pain in the knee, seeing ortho. No recent falls.     Mood is good. Sleeping well. No hallucinations.     Prepping for her annual trip to [place] with 3 other couples. Just returned from visiting grandchildren in [place].     She underwent C4-5 and C6-7 anterior cervical discectomy and fusion in February without complications.     Has reduced her nightly wine intake.     Reports burning and tingling in her hands and feet that started about 1 year ago. Rubs her hands a lot. Most notable when at rest. Not interfering with sleep or particularly bothersome. Continues on her B12 injections.       10/31/23:  Jackie Berry is a 68 y.o. R-handed female seen in f/u for memory loss     Medical history is significant for cervical spondylosis, hx of thyroid disease, anxiety, HTN, vitamin B12 deficiency, GERD, iron deficiency anemia, HLD    Here today with her , who assists with history    MRI brain showed atrophy,  more in the temporal-parietal regions. It also showed a mild to moderate degree of microangiopathy for her age. Serologies showed vit D insufficiency, so she is supplementing. AD Detect indicated high risk ratio. EEG was negative. EKG showed NSR.     No new symptoms or change from last visit.  got a cookbook with Med diet style recipes. Their daughter got him a caregiver book to read.     Mrs. Mejia is planning to have C spine surgery for her chronic neck pain and headaches.     Was apparently drinking more heavily over the last 5-6 years (3-4 bourbon and cokes in the evening) Now, drinks wine in the evenings. Will drink 3-4 glasses if not moderated by .     HISTORY OF PRESENT ILLNESS 9/27/23:  Jackie Berry is a 69 y.o. R-handed female seen in consultation for memory loss per No ref. provider found    Medical history is significant for cervical spondylosis, hx of thyroid disease, anxiety, HTN, vitamin B12 deficiency, GERD, iron deficiency anemia, HLD    Here today with her , who assists with history    HS level education. 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 . 2 adult kids, one in [place] and one in [place].     No FH of dementia that she knows.     Onset was about 5 years ago. It started as ST forgetfulness and repeating questions. She was confusing dates of events. She has been cleaning the same houses for decades and had trouble navigating to them. Every September, they vacation with friends to [place]. A few days ago, they were discussing their recent trip and she couldn't remember having visited this place before. She also later asked her  if he knew her (their) daughter and then thought that they recently got . This episode triggered them to seek a neurology evaluation. Progression has been gradual since onset per .     She is not driving anymore. Used to be a wonderful  "cook, but her  just retired and loves to cook so she doesn't have to do it anymore. Hasn't made any mistakes when she does cook. She used to pay the bills, but her  has been doing this as a precaution. He has also been helping with meds as a precaution.  notes some executive dysfunction. They don't notice any language difficulty.     No physical changes, like falls or gait change.     No personality changes or hallucinations. She does get depressed at times when she realizes that she is forgetting things, but not a day to day issue. Feels stable on Prozac.     She sleeps well. She was diagnosed with sleep apnea in the past and has a CPAP. Study was done 4-5 years ago in [place]; they don't know how severe. She doesn't use it consistently.     She initially saw Dr. Toussaint in [place] for evaluation. She did have NP testing at one time, maybe 5-6 years ago. They don't recall what the results indicated. She had a brain MRI that showed a "slight stroke" that she was asymptomatic of. About 1 year ago, she had a follow up visit. Repeat NP testing was recommended, but they have not been able to get in touch with anyone about this in over 1 year.     She is on monthly B12 injections. Denies hx of gastric surgery.     On progesterone, not sure why.     Former smoker, quit 40 years ago. Has a glass of wine or 2 in the evenings most days.     She had a colonoscopy that showed some ulcers and she was told to not take NSAIDs in the past.      Allergies:  Review of patient's allergies indicates:  No Known Allergies    Current Medications:  Current Outpatient Medications   Medication Sig Dispense Refill    ADULT ASPIRIN REGIMEN 81 mg EC tablet Take by mouth.      amLODIPine (NORVASC) 10 MG tablet Take 10 mg by mouth once daily.      cyanocobalamin, vitamin B-12, 1,000 mcg/mL Kit Inject as directed.      donepeziL (ARICEPT) 10 MG tablet Take 1 tablet (10 mg total) by mouth every evening. 90 tablet 1    estradioL " (ESTRACE) 0.01 % (0.1 mg/gram) vaginal cream Place 1 g vaginally every evening.      famotidine (PEPCID) 20 MG tablet Take 20 mg by mouth 2 (two) times daily.      FLUoxetine 20 MG capsule Take 20 mg by mouth once daily.      hydroCHLOROthiazide (HYDRODIURIL) 12.5 MG Tab Take 12.5 mg by mouth once daily.      rosuvastatin (CRESTOR) 10 MG tablet Take 10 mg by mouth once daily.      TURMERIC-TURMERIC ROOT EXTRACT ORAL Take by mouth. 1500 mg      vitamin D (VITAMIN D3) 1000 units Tab Take 2,000 Units by mouth once daily.      glucosamine-chondroitin 500-400 mg tablet Take 1 tablet by mouth once daily.       No current facility-administered medications for this visit.       Past Medical History:  Past Medical History:   Diagnosis Date    Hypertension     Memory loss     Neuropathy        Past Surgical History:  Past Surgical History:   Procedure Laterality Date    HYSTERECTOMY      SPINE SURGERY         Family History:  family history includes Dementia in her maternal grandmother.    Social History:   reports that she has quit smoking. Her smoking use included cigarettes. She has never used smokeless tobacco. She reports current alcohol use.      REVIEW OF SYSTEMS:  As per HPI    PHYSICAL EXAM:  /68 (BP Location: Right arm, Patient Position: Sitting, BP Method: Medium (Automatic))   Pulse 60   Resp 16   Wt 75.1 kg (165 lb 9.1 oz)   BMI 26.72 kg/m²     General: Well groomed. No acute distress.   Pulmonary: Normal effort and rate.   Musculoskeletal: Arthritic hands, moves all extremities well.  Extremities: No clubbing, cyanosis or edema.      Neurological exam:  Mental status: Awake and alert.  Oriented to person and situation. Aware in medical office, but not location. Disoriented to time. Recent memory impaired. Fund of knowledge limited. Decreased attention and concentration. MMSE 17/30.   Speech/Language: No dysarthria or overt aphasia on conversation but does have mild WFD at times. Able to follow complex  commands.   Cranial nerves (II-XII): Visual fields full. Extraocular movements intact, no ptosis, no nystagmus. Facial sensation and symmetry intact bilaterally. Hearing grossly intact. Palate and tongue deferred. Shoulder shrug normal bilaterally.   Motor: 5 out of 5 strength throughout the upper and lower extremities bilaterally.   Sensation: Intact to light touch and vibration. Negative Tinnel's bilaterally.   DTR: deferred  Coordination: Finger-nose-finger testing intact bilaterally. No tremor.  Gait: Fluid with normal stride and station.     DIAGNOSTIC DATA:  I have personally reviewed provider notes, labs and imaging made available to me today.     Imaging:  MRI brain w wo 10/10/23:      FINDINGS:     Gray-white matter distinction is preserved throughout the brain parenchyma. The ventricles are midline without mass effect. There are involutional changes of the brain parenchyma with expected dilatation of the ventricles. Periventricular and deep white matter FLAIR and T2 hyperintensities are noted felt to reflect changes of chronic vessel ischemic disease. No areas of restricted diffusion or hemorrhage identified. No intra or extra-axial fluid collections or mass. No abnormal intracranial enhancement. Vascular flow voids are within normal limits. Calvarial bone marrow signal is normal.     IMPRESSION:     1.  No acute intracranial abnormality.  2.   Chronic and involutional changes of the brain.    EEG 10/11/23:  IMPRESSION:  Study is within normal limits.     Cardiac:  EKG 10/11/23:  Normal sinus rhythm     Labs:  Lab Results   Component Value Date    WBC 13.1 (H) 02/13/2024    HGB 13.2 02/13/2024    HCT 38.6 02/13/2024     02/13/2024    MCV 96.7 02/13/2024    RDW 11.8 02/13/2024     Lab Results   Component Value Date     09/26/2023    K 3.5 09/26/2023     09/26/2023    CO2 28 09/26/2023    BUN 14 09/26/2023    CREATININE 0.9 09/26/2023     09/26/2023    CALCIUM 9.4 09/26/2023     Lab  "Results   Component Value Date    PROT 7.2 09/26/2023    ALBUMIN 3.8 09/26/2023    BILITOT 0.5 09/26/2023    AST 16 09/26/2023    ALKPHOS 91 09/26/2023    ALT 12 09/26/2023     Lab Results   Component Value Date    INR 0.98 02/08/2024     No results found for: "CHOL", "HDL", "LDLCALC", "TRIG", "CHOLHDL"  No results found for: "LABA1C", "HGBA1C"   Lab Results   Component Value Date    YZXKNTGA05 813 09/26/2023     Lab Results   Component Value Date    FOLATE 8.5 09/26/2023     Lab Results   Component Value Date    TSH 1.712 09/26/2023     Component      Latest Ref Rng 9/26/2023   Encephalopathy, Interpretation SEE BELOW    NMDA-R Ab CBA, Serum      Negative  Negative    Glutamic Acid Decarb Ab      <=0.02 nmol/L 0.00    BLAISE-B-R Ab CBA, Serum      Negative  Negative    AMPA-R Ab CBA, Serum      Negative  Negative    PAVAL LEE-1, Serum      Negative  Negative    PAVAL LEE-2, Serum      Negative  Negative    PAVAL LEE-3, Serum      Negative  Negative    PAVAL AGNA-1, Serum      Negative  Negative    PAVAL, PCA-1, Serum      Negative  Negative    PAVAL, PCA-2, Serum      Negative  Negative    PAVAL, PCA-Tr, Serum      Negative  Negative    PAVAL, Amphiphysin Ab, Serum      Negative  Negative    CRMP-5 IgG      Negative  Negative    LGI1-IgG CBA      Negative  Negative    CASPR2-IgG CBA      Negative  Negative    DPPX Ab IFA, S      Negative  Negative    mGluR1 Ab, IFA, Serum      Negative  Negative    GFAP IFA, Serum      Negative  Negative    IgLON5 IFA, S      Negative  Negative    NIF IFA, S      Negative  Negative    NEUROCHONDRIN, IFA, S      Negative  Negative    M SEPTIN-7 IFA, S      Negative  Negative    IFA NOTES None.    Abeta42      pg/mL 46    Abeta40      pg/mL 327    Abeta 42/40 Ratio 0.141 (L)    Ammonia      10 - 50 umol/L 25    Methlymalonic Acid      <0.40 umol/L 0.20    RPR      Non-reactive  Non-reactive    Thiamine      38 - 122 ug/L 87    Vit D, 25-Hydroxy      30 - 96 ng/mL 24 (L)    HIV 1/2 " Ag/Ab      Non-reactive  Non-reactive    CRP      0.0 - 8.2 mg/L 7.2    Sed Rate      0 - 36 mm/Hr 41 (H)       Legend:  (L) Low  (H) High      ASSESSMENT & PLAN:  Jackie Berry is a 69 y.o. R-handed female seen in f/u for major neurocognitive disorder due to AD.     Problem List Items Addressed This Visit          Neuro    Major neurocognitive disorder due to Alzheimer's disease, without behavioral disturbance - Primary    Overview     MMSE:  16/30 Sept 2023  17/30 Aug 2024    MRI brain w wo : atrophy, most notable in the temporal & parietal regions. Also shows mild to moderate microangiopathy in the evelyn and PV white matter.   Low Abeta 42/40 in serum          Current Assessment & Plan     Stable since last visit  Continue same donepezil 10 mg daily. No indication for memantine at this time.     Cognition and function were assessed as documented above. The functional assessment staging test (FAST) score is 2. The patient is felt to have limited decision making capacity. Medications were reconciled and reviewed for high-risk medications. The patient's behavior and psychiatric health were reviewed and addressed. PHQ-2 score was 0. The patient and family were counseled on safety in the home and with regards to the operation of vehicles. Discussed caregiver needs and social support. Advance Care Plan was reviewed. Written care plan and support information provided to the patient and/or caregiver.                  Follow up: 6 months memory care visit           I appreciate the opportunity to participate in the care of this patient. Please feel free to contact me with any concerns or questions.       Shruti Clarke, ACNPC-AG  [facility]  [address] [place] [address]

## 2024-10-25 DIAGNOSIS — G30.9 MAJOR NEUROCOGNITIVE DISORDER DUE TO ALZHEIMER'S DISEASE, WITHOUT BEHAVIORAL DISTURBANCE: ICD-10-CM

## 2024-10-25 DIAGNOSIS — G31.9 NEURODEGENERATIVE COGNITIVE IMPAIRMENT: ICD-10-CM

## 2024-10-25 DIAGNOSIS — F02.80 MAJOR NEUROCOGNITIVE DISORDER DUE TO ALZHEIMER'S DISEASE, WITHOUT BEHAVIORAL DISTURBANCE: ICD-10-CM

## 2024-10-25 DIAGNOSIS — R41.82 ALTERED MENTAL STATUS, UNSPECIFIED ALTERED MENTAL STATUS TYPE: ICD-10-CM

## 2024-10-28 RX ORDER — DONEPEZIL HYDROCHLORIDE 10 MG/1
10 TABLET, FILM COATED ORAL NIGHTLY
Qty: 90 TABLET | Refills: 1 | Status: SHIPPED | OUTPATIENT
Start: 2024-10-28 | End: 2025-04-26

## 2025-01-07 ENCOUNTER — TELEPHONE (OUTPATIENT)
Dept: NEUROLOGY | Facility: CLINIC | Age: 70
End: 2025-01-07
Payer: MEDICARE

## 2025-01-07 NOTE — TELEPHONE ENCOUNTER
----- Message from Concha sent at 1/7/2025 12:36 PM CST -----  Contact: Spouse Juan Miguel  Type:  Sooner Appointment Request    Caller is requesting a sooner appointment.  Caller declined first available appointment listed below.  Caller will not accept being placed on the waitlist and is requesting a message be sent to doctor.    Name of Caller:  Patients  Juan Miguel  When is the first available appointment?  N/A  Symptoms:  Memory  Would the patient rather a call back or a response via [facility]? Call  Best Call Back Number:  [phone]    Additional Information:  Stated everything has been good and stabilized with her medications and wanted to know if an appt is needed, stated they did receive the recall letter to make an appt. Can we please call back to assist. Thank You

## 2025-07-31 DIAGNOSIS — G31.9 NEURODEGENERATIVE COGNITIVE IMPAIRMENT: ICD-10-CM

## 2025-07-31 DIAGNOSIS — F02.80 MAJOR NEUROCOGNITIVE DISORDER DUE TO ALZHEIMER'S DISEASE, WITHOUT BEHAVIORAL DISTURBANCE: ICD-10-CM

## 2025-07-31 DIAGNOSIS — R41.82 ALTERED MENTAL STATUS, UNSPECIFIED ALTERED MENTAL STATUS TYPE: ICD-10-CM

## 2025-07-31 DIAGNOSIS — G30.9 MAJOR NEUROCOGNITIVE DISORDER DUE TO ALZHEIMER'S DISEASE, WITHOUT BEHAVIORAL DISTURBANCE: ICD-10-CM

## 2025-07-31 RX ORDER — DONEPEZIL HYDROCHLORIDE 10 MG/1
10 TABLET, FILM COATED ORAL NIGHTLY
Qty: 30 TABLET | Refills: 0 | Status: SHIPPED | OUTPATIENT
Start: 2025-07-31 | End: 2026-01-27

## 2025-07-31 NOTE — TELEPHONE ENCOUNTER
Copied from CRM #[id number]. Topic: Medications - Medication Refill  >> Jul 31, 2025  8:57 AM Akshat wrote:  Type:  RX Refill Request    Who Called: Pt  Refill or New Rx:refill  RX Name and Strength:donepeziL (ARICEPT) 10 MG tablet  How is the patient currently taking it? (ex. 1XDay):1xday  Is this a 30 day or 90 day RX:90  Preferred Pharmacy with phone number:  [facility] - [place], [place] - [address] [address]  [address] [address]  Phone: [phone] Fax: [phone]     Local or Mail Order:local  Ordering Provider:Dr. Bahena  Would the patient rather a call back or a response via [facility]? call  Best Call Back Number:[phone]   Additional Information:

## 2025-09-02 ENCOUNTER — TELEPHONE (OUTPATIENT)
Dept: NEUROLOGY | Facility: CLINIC | Age: 70
End: 2025-09-02
Payer: MEDICARE

## 2025-09-02 DIAGNOSIS — R41.82 ALTERED MENTAL STATUS, UNSPECIFIED ALTERED MENTAL STATUS TYPE: ICD-10-CM

## 2025-09-02 DIAGNOSIS — F02.80 MAJOR NEUROCOGNITIVE DISORDER DUE TO ALZHEIMER'S DISEASE, WITHOUT BEHAVIORAL DISTURBANCE: ICD-10-CM

## 2025-09-02 DIAGNOSIS — G31.9 NEURODEGENERATIVE COGNITIVE IMPAIRMENT: ICD-10-CM

## 2025-09-02 DIAGNOSIS — G30.9 MAJOR NEUROCOGNITIVE DISORDER DUE TO ALZHEIMER'S DISEASE, WITHOUT BEHAVIORAL DISTURBANCE: ICD-10-CM

## 2025-09-03 RX ORDER — DONEPEZIL HYDROCHLORIDE 10 MG/1
10 TABLET, FILM COATED ORAL NIGHTLY
Qty: 30 TABLET | Refills: 1 | Status: SHIPPED | OUTPATIENT
Start: 2025-09-03 | End: 2025-11-02